# Patient Record
Sex: MALE | Race: WHITE | NOT HISPANIC OR LATINO | Employment: OTHER | URBAN - METROPOLITAN AREA
[De-identification: names, ages, dates, MRNs, and addresses within clinical notes are randomized per-mention and may not be internally consistent; named-entity substitution may affect disease eponyms.]

---

## 2017-01-30 ENCOUNTER — OFFICE VISIT (OUTPATIENT)
Dept: CARDIOLOGY | Facility: PHYSICIAN GROUP | Age: 82
End: 2017-01-30
Payer: MEDICARE

## 2017-01-30 ENCOUNTER — NON-PROVIDER VISIT (OUTPATIENT)
Dept: CARDIOLOGY | Facility: PHYSICIAN GROUP | Age: 82
End: 2017-01-30
Payer: MEDICARE

## 2017-01-30 VITALS
WEIGHT: 193 LBS | BODY MASS INDEX: 26.14 KG/M2 | HEART RATE: 78 BPM | DIASTOLIC BLOOD PRESSURE: 80 MMHG | BODY MASS INDEX: 26.14 KG/M2 | DIASTOLIC BLOOD PRESSURE: 80 MMHG | WEIGHT: 193 LBS | SYSTOLIC BLOOD PRESSURE: 110 MMHG | HEIGHT: 72 IN | OXYGEN SATURATION: 93 % | HEART RATE: 78 BPM | HEIGHT: 72 IN | SYSTOLIC BLOOD PRESSURE: 110 MMHG | OXYGEN SATURATION: 93 %

## 2017-01-30 DIAGNOSIS — E78.2 MIXED HYPERLIPIDEMIA: ICD-10-CM

## 2017-01-30 DIAGNOSIS — Z95.0 PRESENCE OF PERMANENT CARDIAC PACEMAKER: ICD-10-CM

## 2017-01-30 DIAGNOSIS — I10 ESSENTIAL HYPERTENSION: ICD-10-CM

## 2017-01-30 DIAGNOSIS — I49.5 SICK SINUS SYNDROME (HCC): ICD-10-CM

## 2017-01-30 DIAGNOSIS — I44.30 AV BLOCK: ICD-10-CM

## 2017-01-30 PROCEDURE — G8432 DEP SCR NOT DOC, RNG: HCPCS | Performed by: INTERNAL MEDICINE

## 2017-01-30 PROCEDURE — G8484 FLU IMMUNIZE NO ADMIN: HCPCS | Performed by: INTERNAL MEDICINE

## 2017-01-30 PROCEDURE — G8420 CALC BMI NORM PARAMETERS: HCPCS | Performed by: INTERNAL MEDICINE

## 2017-01-30 PROCEDURE — 99214 OFFICE O/P EST MOD 30 MIN: CPT | Performed by: INTERNAL MEDICINE

## 2017-01-30 PROCEDURE — 93288 INTERROG EVL PM/LDLS PM IP: CPT | Performed by: NURSE PRACTITIONER

## 2017-01-30 PROCEDURE — 1101F PT FALLS ASSESS-DOCD LE1/YR: CPT | Mod: 8P | Performed by: INTERNAL MEDICINE

## 2017-01-30 PROCEDURE — 1036F TOBACCO NON-USER: CPT | Performed by: INTERNAL MEDICINE

## 2017-01-30 PROCEDURE — 4040F PNEUMOC VAC/ADMIN/RCVD: CPT | Performed by: INTERNAL MEDICINE

## 2017-01-30 RX ORDER — RIVASTIGMINE 9.5 MG/24H
1 PATCH, EXTENDED RELEASE TRANSDERMAL DAILY
COMMUNITY
End: 2018-02-21

## 2017-01-30 ASSESSMENT — ENCOUNTER SYMPTOMS
MEMORY LOSS: 1
SHORTNESS OF BREATH: 1

## 2017-01-30 NOTE — MR AVS SNAPSHOT
Moises Baezanetta   2017 2:40 PM   Office Visit   MRN: [id number]    [facility] Phone:  [phone]    Description:  Male : 1931   Provider:  Kenna Hunter M.D.           Reason for Visit     Follow-Up           Allergies as of 2017     Allergen Noted Reactions    Metformin 07/15/2016       seizures    Penicillins 2015   Unspecified    Pt unsure of reaction.      You were diagnosed with     Essential hypertension   [2552369]       Mixed hyperlipidemia   [272.2.ICD-9-CM]       Presence of permanent cardiac pacemaker   [633313]         Vital Signs     Blood Pressure Pulse Height Weight Body Mass Index Oxygen Saturation    110/80 mmHg 78 1.829 m (6') 87.544 kg (193 lb) 26.17 kg/m2 93%    Smoking Status                   Never Smoker            Basic Information     Date Of Birth Sex Race Ethnicity Preferred Language    1931 Male White Non- English      Your appointments     2017  2:40 PM   PREVIOUS PATIENT with Kenna Hunter M.D.   [facility] ([facility]    [address] [facility] [facility] [address]   [phone]            Jul 10, 2017 10:40 AM   PACER CHECK ONLY with MARKO Kimball   [facility] ([facility]    [address] [facility] [facility] [address]   [phone]            Jul 10, 2017 11:00 AM   FOLLOW UP with Kenna Hunter M.D.   [facility] ([facility]    [address] [facility] [facility] [address]   [phone]              Problem List              ICD-10-CM Priority Class Noted - Resolved    Atrial fibrillation (CMS-HCC) I48.91 High  2012 - Present    Fatigue R53.83 Low  2012 - Present    Gout M10.9 Low  2012 - Present    Hyperlipidemia E78.5 High  2012 - Present    Hypertension I10 High  2012 - Present    Hypokalemia E87.6 Medium  2012 - Present    Presence of permanent cardiac pacemaker Z95.0 High  1/16/2012 - Present    Prostate cancer (CMS-HCC) C61 Low  10/7/2013 - Present    Sick sinus syndrome (CMS-HCC) I49.5 High  10/7/2013 - Present    AV block I44.30 High  10/7/2013 - Present    Seizure (CMS-HCC) R56.9   7/6/2015 - Present    Dementia without behavioral disturbance F03.90   7/25/2016 - Present    Type 2 diabetes mellitus (CMS-HCC) E11.9   7/25/2016 - Present    Meningioma (CMS-HCC) D32.9   7/7/2015 - Present      Health Maintenance        Date Due Completion Dates    DIABETES MONOFILAMENT / LE EXAM 3/16/1932 ---    RETINAL SCREENING 9/16/1949 ---    URINE ACR / MICROALBUMIN 9/16/1949 ---    IMM DTaP/Tdap/Td Vaccine (1 - Tdap) 9/16/1950 ---    COLONOSCOPY 9/16/1981 ---    IMM ZOSTER VACCINE 9/16/1991 ---    A1C SCREENING 1/7/2016 7/7/2015    IMM PNEUMOCOCCAL 65+ (ADULT) LOW/MEDIUM RISK SERIES (2 of 2 - PCV13) 7/7/2016 7/7/2015    IMM INFLUENZA (1) 9/1/2016 ---    FASTING LIPID PROFILE 12/17/2016 12/17/2015, 7/7/2015, 10/15/2014, 1/26/2012    SERUM CREATININE 12/13/2017 12/13/2016, 7/13/2015, 7/12/2015, 7/10/2015, 7/9/2015, 7/8/2015, 7/7/2015, 7/6/2015, 10/15/2014, 8/20/2013, 1/26/2012            Current Immunizations     Pneumococcal polysaccharide vaccine (PPSV-23) 7/7/2015  6:23 AM      Below and/or attached are the medications your provider expects you to take. Review all of your home medications and newly ordered medications with your provider and/or pharmacist. Follow medication instructions as directed by your provider and/or pharmacist. Please keep your medication list with you and share with your provider. Update the information when medications are discontinued, doses are changed, or new medications (including over-the-counter products) are added; and carry medication information at all times in the event of emergency situations     Allergies:  METFORMIN - (reactions not documented)     PENICILLINS - Unspecified               Medications  Valid  as of: January 30, 2017 -  2:33 PM    Generic Name Brand Name Tablet Size Instructions for use    Allopurinol (Tab) ZYLOPRIM 100 MG Take 1 Tab by mouth every day.        Aspirin (Tablet Delayed Response) aspirin 81 MG Take 81 mg by mouth.        Atenolol (Tab) TENORMIN 50 MG Take 1 Tab by mouth every day.        Rivastigmine (PATCH 24 HR) EXELON 9.5 MG/24HR Apply 1 Patch to skin as directed every day.        Rosuvastatin Calcium (Tab) CRESTOR 5 MG TAKE 1 TABLET BY MOUTH EVERY DAY        TraZODone HCl (Tab) DESYREL 50 MG Take 50 mg by mouth.        .                 Medicines prescribed today were sent to:     [facility] [address] [place] [address] [place]    [address] [place] [address]    Phone: [phone] Fax: [phone]    Open 24 Hours?: No      Medication refill instructions:       If your prescription bottle indicates you have medication refills left, it is not necessary to call your provider’s office. Please contact your pharmacy and they will refill your medication.    If your prescription bottle indicates you do not have any refills left, you may request refills at any time through one of the following ways: The online [employer] system (except Urgent Care), by calling your provider’s office, or by asking your pharmacy to contact your provider’s office with a refill request. Medication refills are processed only during regular business hours and may not be available until the next business day. Your provider may request additional information or to have a follow-up visit with you prior to refilling your medication.   *Please Note: Medication refills are assigned a new Rx number when refilled electronically. Your pharmacy may indicate that no refills were authorized even though a new prescription for the same medication is available at the pharmacy. Please request the medicine by name with the pharmacy before contacting your provider for a  refill.        Your To Do List     Future Labs/Procedures Complete By Expires    COMP METABOLIC PANEL  As directed 1/30/2018    LIPID PROFILE  As directed 1/30/2018    LIPID PROFILE  As directed 1/30/2018         MyChart Status: Patient Declined

## 2017-01-30 NOTE — MR AVS SNAPSHOT
"        Moises Baezazehra   2017 2:00 PM   Non-Provider Visit   MRN: [id number]    [facility] Phone:  [phone]    Description:  Male : 1931   Provider:  VITA KimballPCELINE.           Reason for Visit     AICD Check/Dysfunction [facility]      Allergies as of 2017     Allergen Noted Reactions    Metformin 07/15/2016       seizures    Penicillins 2015   Unspecified    Pt unsure of reaction.      You were diagnosed with     Presence of permanent cardiac pacemaker   [397964]       Sick sinus syndrome (CMS-HCC)   [261621]       AV block   [782921]         Vital Signs     Blood Pressure Pulse Height Weight Body Mass Index Oxygen Saturation    110/80 mmHg 78 1.829 m (6' 0.01\") 87.544 kg (193 lb) 26.17 kg/m2 93%    Smoking Status                   Never Smoker            Basic Information     Date Of Birth Sex Race Ethnicity Preferred Language    1931 Male White Non- English      Your appointments     2017  2:40 PM   PREVIOUS PATIENT with Kenna Hunter M.D.   [facility] ([facility]    [address] [facility] [address]   [phone]            Jul 10, 2017 10:40 AM   PACER CHECK ONLY with VITA KimballP.ZEHRA.   [facility] ([facility]    [address] [facility] [address]   [phone]            Jul 10, 2017 11:00 AM   FOLLOW UP with Kenna Hunter M.D.   [facility]    [address] [facility] [address]   [phone]              Problem List              ICD-10-CM Priority Class Noted - Resolved    Atrial fibrillation (CMS-HCC) I48.91 High  2012 - Present    Fatigue R53.83 Low  2012 - Present    Gout M10.9 Low  2012 - Present    Hyperlipidemia E78.5 High  2012 - Present    Hypertension I10 High  2012 - Present    Hypokalemia E87.6 Medium  2012 " - Present    Presence of permanent cardiac pacemaker Z95.0 High  1/16/2012 - Present    Prostate cancer (CMS-HCC) C61 Low  10/7/2013 - Present    Sick sinus syndrome (CMS-HCC) I49.5 High  10/7/2013 - Present    AV block I44.30 High  10/7/2013 - Present    Seizure (CMS-HCC) R56.9   7/6/2015 - Present    Dementia without behavioral disturbance F03.90   7/25/2016 - Present    Type 2 diabetes mellitus (CMS-HCC) E11.9   7/25/2016 - Present    Meningioma (CMS-HCC) D32.9   7/7/2015 - Present      Health Maintenance        Date Due Completion Dates    DIABETES MONOFILAMENT / LE EXAM 3/16/1932 ---    RETINAL SCREENING 9/16/1949 ---    URINE ACR / MICROALBUMIN 9/16/1949 ---    IMM DTaP/Tdap/Td Vaccine (1 - Tdap) 9/16/1950 ---    COLONOSCOPY 9/16/1981 ---    IMM ZOSTER VACCINE 9/16/1991 ---    A1C SCREENING 1/7/2016 7/7/2015    IMM PNEUMOCOCCAL 65+ (ADULT) LOW/MEDIUM RISK SERIES (2 of 2 - PCV13) 7/7/2016 7/7/2015    IMM INFLUENZA (1) 9/1/2016 ---    FASTING LIPID PROFILE 12/17/2016 12/17/2015, 7/7/2015, 10/15/2014, 1/26/2012    SERUM CREATININE 12/13/2017 12/13/2016, 7/13/2015, 7/12/2015, 7/10/2015, 7/9/2015, 7/8/2015, 7/7/2015, 7/6/2015, 10/15/2014, 8/20/2013, 1/26/2012            Current Immunizations     Pneumococcal polysaccharide vaccine (PPSV-23) 7/7/2015  6:23 AM      Below and/or attached are the medications your provider expects you to take. Review all of your home medications and newly ordered medications with your provider and/or pharmacist. Follow medication instructions as directed by your provider and/or pharmacist. Please keep your medication list with you and share with your provider. Update the information when medications are discontinued, doses are changed, or new medications (including over-the-counter products) are added; and carry medication information at all times in the event of emergency situations     Allergies:  METFORMIN - (reactions not documented)     PENICILLINS - Unspecified                  Medications  Valid as of: January 30, 2017 -  2:33 PM    Generic Name Brand Name Tablet Size Instructions for use    Allopurinol (Tab) ZYLOPRIM 100 MG Take 1 Tab by mouth every day.        Aspirin (Tablet Delayed Response) aspirin 81 MG Take 81 mg by mouth.        Atenolol (Tab) TENORMIN 50 MG Take 1 Tab by mouth every day.        Rivastigmine (PATCH 24 HR) EXELON 9.5 MG/24HR Apply 1 Patch to skin as directed every day.        Rosuvastatin Calcium (Tab) CRESTOR 5 MG TAKE 1 TABLET BY MOUTH EVERY DAY        TraZODone HCl (Tab) DESYREL 50 MG Take 50 mg by mouth.        .                 Medicines prescribed today were sent to:     [facility] [address] [place] [address] [place]    [address] [place] [address] [place] [address]    Phone: [phone] Fax: [phone]    Open 24 Hours?: No      Medication refill instructions:       If your prescription bottle indicates you have medication refills left, it is not necessary to call your provider’s office. Please contact your pharmacy and they will refill your medication.    If your prescription bottle indicates you do not have any refills left, you may request refills at any time through one of the following ways: The online [employer] system (except Urgent Care), by calling your provider’s office, or by asking your pharmacy to contact your provider’s office with a refill request. Medication refills are processed only during regular business hours and may not be available until the next business day. Your provider may request additional information or to have a follow-up visit with you prior to refilling your medication.   *Please Note: Medication refills are assigned a new Rx number when refilled electronically. Your pharmacy may indicate that no refills were authorized even though a new prescription for the same medication is available at the pharmacy. Please request the medicine by name with the pharmacy before contacting your  provider for a refill.           MyChart Status: Patient Declined

## 2017-01-30 NOTE — PROGRESS NOTES
Subjective:   Moises Ordoñez is a 85 y.o. male with known history of sick sinus syndrome status post pacemaker, paroxysmal atrial fibrillation not on anticoagulation, hypertension, dyslipidemia, advanced dementia presenting today for follow-up evaluation of device.    Patient has advanced dementia and unable to get much information. According to wife he never complains of any chest pain palpitations. No complaints of dyspnea, lower extremity edema.      Past Medical History   Diagnosis Date   • Atrial fibrillation (CMS-[facility])     • Fatigue     • GOUT     • Hyperlipidemia     • Hypertension     • Hypokalemia     • Sick sinus syndrome (CMS-[facility])       1991, 1997, 2003.   • Prostate cancer (CMS-[facility]) 1992     Status post radical prostatectomy.   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 DR #2110 implanted by Dr. Gamal Borja. Original device implanted in 1992.   • Inguinal hernia repair  1993         • Prostatectomy, radical retro  1992         • Tonsillectomy  1937         • Recovery  8/21/2013     Performed by Cath-Recovery Surgery at SURGERY SAME DAY [facility] ORS     Family History   Problem Relation Age of Onset   • Lung Disease Father      COPD   • Other Brother      Rheumatic fever     History   Smoking status   • Never Smoker    Smokeless tobacco   • Never Used     Allergies   Allergen Reactions   • Metformin      seizures   • Penicillins Unspecified     Pt unsure of reaction.     Outpatient Encounter Prescriptions as of 1/30/2017   Medication Sig Dispense Refill   • CRESTOR 5 MG Tab TAKE 1 TABLET BY MOUTH EVERY DAY 30 Tab 6   • allopurinol (ZYLOPRIM) 100 MG Tab Take 1 Tab by mouth every day. 90 Tab 3   • atenolol (TENORMIN) 50 MG Tab Take 1 Tab by mouth every day. 90 Tab 1   • aspirin 81 MG EC tablet Take 81 mg by mouth.      • trazodone (DESYREL) 50 MG Tab Take 50 mg by mouth.       No facility-administered encounter medications on file as of 2017.     Review of Systems   Unable to perform ROS: dementia   Respiratory: Positive for shortness of breath.    Cardiovascular: Positive for chest pain.   Psychiatric/Behavioral: Positive for memory loss.        Objective:   There were no vitals taken for this visit.    Physical Exam   Constitutional: He appears well-developed and well-nourished.   Mild kyphoscoliosis.   HENT:   Head: Normocephalic and atraumatic.   Eyes: Conjunctivae and EOM are normal. Right eye exhibits no discharge. Left eye exhibits no discharge.   Neck: No JVD present. No tracheal deviation present. No thyroid mass present.   Cardiovascular: Normal rate, regular rhythm, S1 normal, S2 normal and normal pulses.  PMI is not displaced.  Exam reveals no gallop.    No murmur heard.  Pulses:       Carotid pulses are 2+ on the right side, and 2+ on the left side.       Radial pulses are 2+ on the right side, and 2+ on the left side.        Femoral pulses are 2+ on the right side, and 2+ on the left side.       Dorsalis pedis pulses are 2+ on the right side, and 2+ on the left side.   No peripheral edema.   Pulmonary/Chest: Effort normal and breath sounds normal. No respiratory distress. He has no wheezes. He has no rales.   Abdominal: Soft. Normal appearance. He exhibits no abdominal bruit. There is no hepatosplenomegaly. There is no tenderness.   Musculoskeletal: Normal range of motion. He exhibits no edema.        Thoracic back: He exhibits no tenderness and no spasm.   Neurological: He is alert.   He has severe short-term memory deficit.   Skin: No rash noted. No cyanosis. Nails show no clubbing.   Psychiatric: He has a normal mood and affect.     EK/6/15  EKG personally reviewed by me.  AV paced at 70 bpm.    Results for ANNI ALCAZAR (MRN [id number]) as of 2017 14:07   2016    Sodium 137  140   Potassium 3.7 4.2   Chloride 104 105   Co2 26 31   Anion Gap 7.0 4.0   Glucose 109 (H) 111 (H)   Bun 15 17   Creatinine 0.88 1.00   GFR If Non African American >60 >60   Calcium 9.5 9.4   AST(SGOT)  22   ALT(SGPT)  19   Alkaline Phosphatase  74     Assessment:     1. SSS s/p PPM  2. Paroxysmal atrial fibrillation  3. Dyslipidemia  4. HTN    Medical Decision Making:  Today's Assessment / Status / Plan:     1. Device interrogation from today showed no mode switching episodes.  Normal sensing of RA lead; unable to measure R waves. Stable capture of RA and RV leads; stable impedances. Battery longevity is 6.7-7.3 years.  2. Last 6 months no episodes of A. Fib  Secondary to advanced dementia will avoid anticoagulation for now.  If patient has further episodes of A. fib will discuss with family before initiating anticoagulation.  Continue aspirin.  3. Continue Crestor.  Check labs now and again in one year  4. Blood pressure well controlled on atenolol.    Follow-up in 6 months with Agueda and in one year with me.  Check labs now and in one year    Thank you for allowing me to participate in taking care of patient.    Kenna Tsai MD.

## 2017-01-30 NOTE — Clinical Note
[facility]   [address] [place] [address]  Phone: [phone]  Fax: [phone]              Moises Ordoñez  9/16/1931    Encounter Date: 1/30/2017    Kenna Hunter M.D.          PROGRESS NOTE:  Subjective:   Moises Ordoñez is a 85 y.o. male with known history of sick sinus syndrome status post pacemaker, paroxysmal atrial fibrillation not on anticoagulation, hypertension, dyslipidemia, advanced dementia presenting today for follow-up evaluation of device.    Patient has advanced dementia and unable to get much information. According to wife he never complains of any chest pain palpitations. No complaints of dyspnea, lower extremity edema.      Past Medical History   Diagnosis Date   • Atrial fibrillation (CMS-[facility])     • Fatigue     • GOUT     • Hyperlipidemia     • Hypertension     • Hypokalemia     • Sick sinus syndrome (CMS-HCC)       1991, 1997, 2003.   • Prostate cancer (CMS-[facility]) 1992     Status post radical prostatectomy.   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 DR #2110 implanted by Dr. Gamal Borja. Original device implanted in 1992.   • Inguinal hernia repair  1993         • Prostatectomy, radical retro  1992         • Tonsillectomy  1937         • Recovery  8/21/2013     Performed by Cath-Recovery Surgery at SURGERY SAME DAY [facility] ORS     Family History   Problem Relation Age of Onset   • Lung Disease Father      COPD   • Other Brother      Rheumatic fever     History   Smoking status   • Never Smoker    Smokeless tobacco   • Never Used     Allergies   Allergen Reactions   • Metformin      seizures   • Penicillins Unspecified     Pt unsure of reaction.     Outpatient Encounter Prescriptions as of 1/30/2017   Medication Sig  Dispense Refill   • CRESTOR 5 MG Tab TAKE 1 TABLET BY MOUTH EVERY DAY 30 Tab 6   • allopurinol (ZYLOPRIM) 100 MG Tab Take 1 Tab by mouth every day. 90 Tab 3   • atenolol (TENORMIN) 50 MG Tab Take 1 Tab by mouth every day. 90 Tab 1   • aspirin 81 MG EC tablet Take 81 mg by mouth.     • trazodone (DESYREL) 50 MG Tab Take 50 mg by mouth.       No facility-administered encounter medications on file as of 1/30/2017.     Review of Systems   Unable to perform ROS: dementia   Respiratory: Positive for shortness of breath.    Cardiovascular: Positive for chest pain.   Psychiatric/Behavioral: Positive for memory loss.        Objective:   There were no vitals taken for this visit.    Physical Exam   Constitutional: He appears well-developed and well-nourished.   Mild kyphoscoliosis.   HENT:   Head: Normocephalic and atraumatic.   Eyes: Conjunctivae and EOM are normal. Right eye exhibits no discharge. Left eye exhibits no discharge.   Neck: No JVD present. No tracheal deviation present. No thyroid mass present.   Cardiovascular: Normal rate, regular rhythm, S1 normal, S2 normal and normal pulses.  PMI is not displaced.  Exam reveals no gallop.    No murmur heard.  Pulses:       Carotid pulses are 2+ on the right side, and 2+ on the left side.       Radial pulses are 2+ on the right side, and 2+ on the left side.        Femoral pulses are 2+ on the right side, and 2+ on the left side.       Dorsalis pedis pulses are 2+ on the right side, and 2+ on the left side.   No peripheral edema.   Pulmonary/Chest: Effort normal and breath sounds normal. No respiratory distress. He has no wheezes. He has no rales.   Abdominal: Soft. Normal appearance. He exhibits no abdominal bruit. There is no hepatosplenomegaly. There is no tenderness.   Musculoskeletal: Normal range of motion. He exhibits no edema.        Thoracic back: He exhibits no tenderness and no spasm.   Neurological: He is alert.   He has severe short-term memory deficit.   Skin:  No rash noted. No cyanosis. Nails show no clubbing.   Psychiatric: He has a normal mood and affect.     EK/6/15  EKG personally reviewed by me.  AV paced at 70 bpm.    Results for ANNI ALCAZAR (MRN [id number]) as of 2017 14:07   2016    Sodium 137 140   Potassium 3.7 4.2   Chloride 104 105   Co2 26 31   Anion Gap 7.0 4.0   Glucose 109 (H) 111 (H)   Bun 15 17   Creatinine 0.88 1.00   GFR If Non African American >60 >60   Calcium 9.5 9.4   AST(SGOT)  22   ALT(SGPT)  19   Alkaline Phosphatase  74     Assessment:     1. SSS s/p PPM  2. Paroxysmal atrial fibrillation  3. Dyslipidemia  4. HTN    Medical Decision Making:  Today's Assessment / Status / Plan:     1. Device interrogation from today showed no mode switching episodes.  Normal sensing of RA lead; unable to measure R waves. Stable capture of RA and RV leads; stable impedances. Battery longevity is 6.7-7.3 years.  2. Last 6 months no episodes of A. Fib  Secondary to advanced dementia will avoid anticoagulation for now.  If patient has further episodes of A. fib will discuss with family before initiating anticoagulation.  3. Continue Crestor.  Check labs now and again in one year  4. Blood pressure well controlled on atenolol.    Follow-up in 6 months with Agueda and in one year with me.  Check labs now and in one year    Thank you for allowing me to participate in taking care of patient.    Kenna Hunter. MD.      Shellie Borja M.D.  [address]  [address]  [facility] [address]  VIA In Basket

## 2017-01-30 NOTE — PROGRESS NOTES
Device is working normally. No mode switching episodes.  Normal sensing of RA lead; unable to measure R waves. Stable capture of RA and RV leads; stable impedances. Battery longevity is 6.7-7.3 years.  No changes are made today.    FU in 6 months for next PM check with me. He does see Dr. Hunter today as well.    Collaborating MD: Elsa

## 2017-02-22 ENCOUNTER — HOSPITAL ENCOUNTER (OUTPATIENT)
Dept: LAB | Facility: MEDICAL CENTER | Age: 82
End: 2017-02-22
Attending: INTERNAL MEDICINE
Payer: MEDICARE

## 2017-02-22 DIAGNOSIS — E78.2 MIXED HYPERLIPIDEMIA: ICD-10-CM

## 2017-02-22 LAB
ALBUMIN SERPL BCP-MCNC: 4.1 G/DL (ref 3.2–4.9)
ALBUMIN/GLOB SERPL: 1.2 G/DL
ALP SERPL-CCNC: 74 U/L (ref 30–99)
ALT SERPL-CCNC: 20 U/L (ref 2–50)
ANION GAP SERPL CALC-SCNC: 7 MMOL/L (ref 0–11.9)
AST SERPL-CCNC: 25 U/L (ref 12–45)
BILIRUB SERPL-MCNC: 0.5 MG/DL (ref 0.1–1.5)
BUN SERPL-MCNC: 14 MG/DL (ref 8–22)
CALCIUM SERPL-MCNC: 9.4 MG/DL (ref 8.5–10.5)
CHLORIDE SERPL-SCNC: 104 MMOL/L (ref 96–112)
CHOLEST SERPL-MCNC: 100 MG/DL (ref 100–199)
CO2 SERPL-SCNC: 30 MMOL/L (ref 20–33)
CREAT SERPL-MCNC: 0.8 MG/DL (ref 0.5–1.4)
GLOBULIN SER CALC-MCNC: 3.3 G/DL (ref 1.9–3.5)
GLUCOSE SERPL-MCNC: 93 MG/DL (ref 65–99)
HDLC SERPL-MCNC: 36 MG/DL
LDLC SERPL CALC-MCNC: 46 MG/DL
POTASSIUM SERPL-SCNC: 4.1 MMOL/L (ref 3.6–5.5)
PROT SERPL-MCNC: 7.4 G/DL (ref 6–8.2)
SODIUM SERPL-SCNC: 141 MMOL/L (ref 135–145)
TRIGL SERPL-MCNC: 90 MG/DL (ref 0–149)

## 2017-02-22 PROCEDURE — 80061 LIPID PANEL: CPT

## 2017-02-22 PROCEDURE — 36415 COLL VENOUS BLD VENIPUNCTURE: CPT

## 2017-02-22 PROCEDURE — 80053 COMPREHEN METABOLIC PANEL: CPT

## 2017-02-22 RX ORDER — ATENOLOL 50 MG/1
TABLET ORAL
Qty: 90 TAB | Refills: 1 | Status: SHIPPED | OUTPATIENT
Start: 2017-02-22 | End: 2017-08-17 | Stop reason: SDUPTHER

## 2017-02-23 NOTE — PROGRESS NOTES
Results for ANNI ALCAZAR (MRN [id number]) as of 2/23/2017 15:55   12/13/2016 2/22/2017    Sodium 140 141   Potassium 4.2 4.1   Chloride 105 104   Co2 31 30   Anion Gap 4.0 7.0   Glucose 111 (H) 93   Bun 17 14   Creatinine 1.00 0.80   GFR If Non African American >60 >60   Calcium 9.4 9.4   AST(SGOT) 22 25   ALT(SGPT) 19 20   Alkaline Phosphatase 74 74   Cholesterol,Tot  100   Triglycerides  90   HDL  36 (A)   LDL  46

## 2017-05-16 RX ORDER — ROSUVASTATIN CALCIUM 5 MG/1
TABLET, COATED ORAL
Qty: 30 TAB | Refills: 3 | Status: SHIPPED | OUTPATIENT
Start: 2017-05-16 | End: 2017-08-17 | Stop reason: SDUPTHER

## 2017-07-17 LAB — HBA1C MFR BLD: 6.5 % (ref ?–5.8)

## 2017-07-18 ENCOUNTER — OFFICE VISIT (OUTPATIENT)
Dept: MEDICAL GROUP | Facility: MEDICAL CENTER | Age: 82
End: 2017-07-18
Payer: MEDICARE

## 2017-07-18 VITALS
TEMPERATURE: 98.2 F | OXYGEN SATURATION: 93 % | HEART RATE: 70 BPM | SYSTOLIC BLOOD PRESSURE: 124 MMHG | WEIGHT: 194 LBS | DIASTOLIC BLOOD PRESSURE: 72 MMHG | HEIGHT: 72 IN | BODY MASS INDEX: 26.28 KG/M2

## 2017-07-18 DIAGNOSIS — F03.90 DEMENTIA WITHOUT BEHAVIORAL DISTURBANCE, UNSPECIFIED DEMENTIA TYPE: ICD-10-CM

## 2017-07-18 DIAGNOSIS — E11.9 TYPE 2 DIABETES MELLITUS WITHOUT COMPLICATION, WITHOUT LONG-TERM CURRENT USE OF INSULIN (HCC): ICD-10-CM

## 2017-07-18 DIAGNOSIS — D32.9 MENINGIOMA (HCC): ICD-10-CM

## 2017-07-18 DIAGNOSIS — R29.6 FREQUENT FALLS: ICD-10-CM

## 2017-07-18 PROCEDURE — 99214 OFFICE O/P EST MOD 30 MIN: CPT | Performed by: FAMILY MEDICINE

## 2017-07-18 RX ORDER — GLIPIZIDE 5 MG/1
5 TABLET ORAL 2 TIMES DAILY
Qty: 60 TAB | Refills: 3 | Status: SHIPPED | OUTPATIENT
Start: 2017-07-18 | End: 2017-08-17 | Stop reason: SDUPTHER

## 2017-07-18 NOTE — MR AVS SNAPSHOT
Moises Baezanetta   2017 10:40 AM   Office Visit   MRN: [id number]    Department:  [facility]   Dept Phone:  [phone]    Description:  Male : 1931   Provider:  Shellie Borja M.D.           Reason for Visit     Hospital Follow-up           Allergies as of 2017     Allergen Noted Reactions    Metformin 07/15/2016       seizures    Penicillins 2015   Unspecified    Pt unsure of reaction.      You were diagnosed with     Type 2 diabetes mellitus without complication, without long-term current use of insulin (CMS-HCC)   [4993473]       Dementia without behavioral disturbance, unspecified dementia type   [5833917]       Meningioma (CMS-HCC)   [687934]       Frequent falls   [190917]         Vital Signs     Blood Pressure Pulse Temperature Height Weight Body Mass Index    124/72 mmHg 70 36.8 °C (98.2 °F) 1.829 m (6') 87.998 kg (194 lb) 26.31 kg/m2    Oxygen Saturation Smoking Status                93% Never Smoker           Basic Information     Date Of Birth Sex Race Ethnicity Preferred Language    1931 Male White Non- English      Your appointments     Aug 16, 2017  3:00 PM   PACER CHECK ONLY with MARKO Kimball   [facility] (--)    [address] [address] [phone]            Aug 16, 2017  3:40 PM   FOLLOW UP with Kenna Hunter M.D.   [facility] (--)    [address] [address] [phone]            Aug 17, 2017 11:00 AM   Established Patient with Shellie Borja M.D.   [facility] ([facility])    [address]  [facility] [address] [phone]           You will be receiving a confirmation call a few days before your appointment from our automated call confirmation system.              Problem List              ICD-10-CM Priority Class Noted - Resolved    Atrial  fibrillation (CMS-HCC) I48.91 High  1/16/2012 - Present    Fatigue R53.83 Low  1/16/2012 - Present    Gout M10.9 Low  1/16/2012 - Present    Hyperlipidemia E78.5 High  1/16/2012 - Present    Hypertension I10 High  1/16/2012 - Present    Hypokalemia E87.6 Medium  1/16/2012 - Present    Presence of permanent cardiac pacemaker Z95.0 High  1/16/2012 - Present    Prostate cancer (CMS-HCC) C61 Low  10/7/2013 - Present    Sick sinus syndrome (CMS-HCC) I49.5 High  10/7/2013 - Present    AV block I44.30 High  10/7/2013 - Present    Seizure (CMS-HCC) R56.9   7/6/2015 - Present    Dementia without behavioral disturbance F03.90   7/25/2016 - Present    Type 2 diabetes mellitus (CMS-HCC) E11.9   7/25/2016 - Present    Meningioma (CMS-HCC) D32.9   7/7/2015 - Present    Frequent falls R29.6   7/18/2017 - Present      Health Maintenance        Date Due Completion Dates    DIABETES MONOFILAMENT / LE EXAM 3/16/1932 ---    RETINAL SCREENING 9/16/1949 ---    URINE ACR / MICROALBUMIN 9/16/1949 ---    IMM DTaP/Tdap/Td Vaccine (1 - Tdap) 9/16/1950 ---    COLONOSCOPY 9/16/1981 ---    IMM ZOSTER VACCINE 9/16/1991 ---    A1C SCREENING 1/7/2016 7/7/2015    IMM INFLUENZA (1) 9/1/2017 9/20/2016    FASTING LIPID PROFILE 2/22/2018 2/22/2017, 12/17/2015, 7/7/2015, 10/15/2014, 1/26/2012    SERUM CREATININE 2/22/2018 2/22/2017, 12/13/2016, 7/13/2015, 7/12/2015, 7/10/2015, 7/9/2015, 7/8/2015, 7/7/2015, 7/6/2015, 10/15/2014, 8/20/2013, 1/26/2012            Current Immunizations     13-VALENT PCV PREVNAR 10/5/2016    Influenza Vaccine Quad Inj (Preserved) 9/20/2016    Pneumococcal polysaccharide vaccine (PPSV-23) 7/7/2015  6:23 AM      Below and/or attached are the medications your provider expects you to take. Review all of your home medications and newly ordered medications with your provider and/or pharmacist. Follow medication instructions as directed by your provider and/or pharmacist. Please keep your medication list with you and share with your  provider. Update the information when medications are discontinued, doses are changed, or new medications (including over-the-counter products) are added; and carry medication information at all times in the event of emergency situations     Allergies:  METFORMIN - (reactions not documented)     PENICILLINS - Unspecified               Medications  Valid as of: July 18, 2017 - 10:53 AM    Generic Name Brand Name Tablet Size Instructions for use    Allopurinol (Tab) ZYLOPRIM 100 MG Take 1 Tab by mouth every day.        Aspirin (Tablet Delayed Response) aspirin 81 MG Take 81 mg by mouth.        Atenolol (Tab) TENORMIN 50 MG TAKE 1 TABLET BY MOUTH EVERY DAY        GlipiZIDE (Tab) GLUCOTROL 5 MG Take 1 Tab by mouth 2 times a day.        Rivastigmine (PATCH 24 HR) EXELON 9.5 MG/24HR Apply 1 Patch to skin as directed every day.        Rosuvastatin Calcium (Tab) CRESTOR 5 MG TAKE 1 TABLET BY MOUTH EVERY DAY        TraZODone HCl (Tab) DESYREL 50 MG Take 50 mg by mouth.        .                 Medicines prescribed today were sent to:     [facility] - [address] AT [address] [address]    Phone: [phone] Fax: [phone]    Open 24 Hours?: No    [facility] - [address] [address]    Phone: [phone] Fax: [phone]    Open 24 Hours?: No      Medication refill instructions:       If your prescription bottle indicates you have medication refills left, it is not necessary to call your provider’s office. Please contact your pharmacy and they will refill your medication.    If your prescription bottle indicates you do not have any refills left, you may request refills at any time through one of the following ways: The online [email or web address] system (except Urgent Care), by calling your provider’s office, or by asking your pharmacy to contact your provider’s office with  a refill request. Medication refills are processed only during regular business hours and may not be available until the next business day. Your provider may request additional information or to have a follow-up visit with you prior to refilling your medication.   *Please Note: Medication refills are assigned a new Rx number when refilled electronically. Your pharmacy may indicate that no refills were authorized even though a new prescription for the same medication is available at the pharmacy. Please request the medicine by name with the pharmacy before contacting your provider for a refill.        Your To Do List     Future Labs/Procedures Complete By Expires    BASIC METABOLIC PANEL  As directed 7/19/2018    HEMOGLOBIN A1C  As directed 7/19/2018      Referral     A referral request has been sent to our patient care coordination department. Please allow 3-5 business days for us to process this request and contact you either by phone or mail. If you do not hear from us by the 5th business day, please call us at [phone].           MyChart Status: Patient Declined

## 2017-07-19 NOTE — ASSESSMENT & PLAN NOTE
Patient continues to have worsening of his dementia. He is beginning to wander and about a month ago the police had to be called to find him. He needs constant supervision. The family is trying to do this in the home. His son is here today with the patient and his wife and they're concerned about his increased weakness and frequent falls. He seems unstable at times. He does have a history of a meningioma but that has not caused problems in the past. He hasn't been doing any physical therapy. He is followed by a neurologist for his dementia. His son  is concerned because he doesn't seem to sleep well. He is taking trazodone from the neurologist. This has been working well according to the wife except for the last 3 nights. We discussed the increased risk of falls with any medications for insomnia and that I would not recommend adding anything new in.

## 2017-07-19 NOTE — PROGRESS NOTES
Subjective:     Chief Complaint   Patient presents with   • Hospital Follow-up       Moises Ordoñez is a 85 y.o. male here today for evaluation and management of:    Dementia without behavioral disturbance  Patient continues to have worsening of his dementia. He is beginning to wander and about a month ago the police had to be called to find him. He needs constant supervision. The family is trying to do this in the home. His son is here today with the patient and his wife and they're concerned about his increased weakness and frequent falls. He seems unstable at times. He does have a history of a meningioma but that has not caused problems in the past. He hasn't been doing any physical therapy. He is followed by a neurologist for his dementia. His son  is concerned because he doesn't seem to sleep well. He is taking trazodone from the neurologist. This has been working well according to the wife except for the last 3 nights. We discussed the increased risk of falls with any medications for insomnia and that I would not recommend adding anything new in.    Type 2 diabetes mellitus  Patient was seen in the emergency room in [place] for urinary frequency and was found to have a elevated blood sugar of 194. The follow-up hemoglobin A1c was 6.5. Patient years ago was started on metformin and had a seizure that at the time was felt possibly related to metformin. The patient's son and wife cannot remember if he was hypoglycemic at that time. He loves sweets and has been eating more of that. And as previously mentioned he has not been exercising as much as he had in the past.       Allergies   Allergen Reactions   • Metformin      seizures   • Penicillins Unspecified     Pt unsure of reaction.       Current medicines (including changes today)  Current Outpatient Prescriptions   Medication Sig Dispense Refill   • glipiZIDE (GLUCOTROL) 5 MG Tab Take 1 Tab by mouth 2 times a day. 60 Tab 3   • rosuvastatin  (CRESTOR) 5 MG Tab TAKE 1 TABLET BY MOUTH EVERY DAY 30 Tab 3   • atenolol (TENORMIN) 50 MG Tab TAKE 1 TABLET BY MOUTH EVERY DAY 90 Tab 1   • rivastigmine (EXELON) 9.5 MG/24HR patch Apply 1 Patch to skin as directed every day.     • allopurinol (ZYLOPRIM) 100 MG Tab Take 1 Tab by mouth every day. 90 Tab 3   • aspirin 81 MG EC tablet Take 81 mg by mouth.     • trazodone (DESYREL) 50 MG Tab Take 50 mg by mouth.       No current facility-administered medications for this visit.       He  has a past medical history of Atrial fibrillation (CMS-HCC) ( ); Fatigue ( ); GOUT ( ); Hyperlipidemia ( ); Hypertension ( ); Hypokalemia ( ); Sick sinus syndrome (CMS-HCC) ( ); Prostate cancer (CMS-HCC) (1992); AV block, complete (CMS-HCC); and Dementia without behavioral disturbance (7/25/2016).    Patient Active Problem List    Diagnosis Date Noted   • Sick sinus syndrome (CMS-HCC) 10/07/2013     Priority: High   • AV block 10/07/2013     Priority: High   • Atrial fibrillation (CMS-HCC) 01/16/2012     Priority: High   • Hyperlipidemia 01/16/2012     Priority: High   • Hypertension 01/16/2012     Priority: High   • Presence of permanent cardiac pacemaker 01/16/2012     Priority: High   • Hypokalemia 01/16/2012     Priority: Medium   • Prostate cancer (CMS-HCC) 10/07/2013     Priority: Low   • Fatigue 01/16/2012     Priority: Low   • Gout 01/16/2012     Priority: Low   • Frequent falls 07/18/2017   • Dementia without behavioral disturbance 07/25/2016   • Type 2 diabetes mellitus (CMS-HCC) 07/25/2016   • Meningioma (CMS-HCC) 07/07/2015   • Seizure (CMS-HCC) 07/06/2015       ROS   No fever or chills.  No nausea or vomiting.  No chest pain or palpitations.  No cough or SOB.  No pain with urination or hematuria.  No black or bloody stools.       Objective:     Blood pressure 124/72, pulse 70, temperature 36.8 °C (98.2 °F), height 1.829 m (6'), weight 87.998 kg (194 lb), SpO2 93 %. Body mass index is 26.31 kg/(m^2).   Physical Exam:  Well  developed, well nourished.  Alert and in no acute distress.  Eye contact is good, speech goal directed, affect calm  Eyes: conjunctiva non-injected, sclera non-icteric.  Neck Supple.  No adenopathy or masses in the neck or supraclavicular regions. No thyromegaly  Lungs: clear to auscultation bilaterally with good excursion. No wheezes or rhonchi  CV: regular rate and rhythm. No murmur              Assessment and Plan:   The following treatment plan was discussed    1. Type 2 diabetes mellitus without complication, without long-term current use of insulin (CMS-[facility])  We'll start the patient on glipizide 5 mg twice a day. Recheck labs in a month  - BASIC METABOLIC PANEL; Future  - HEMOGLOBIN A1C; Future  - glipiZIDE (GLUCOTROL) 5 MG Tab; Take 1 Tab by mouth 2 times a day.  Dispense: 60 Tab; Refill: 3    2. Dementia without behavioral disturbance, unspecified dementia type  Refer to physical therapy. I did discuss with the wife and son that the patient may be approaching the time where they're going to need assistance. They continue to discuss this as a family  - REFERRAL TO PHYSICAL THERAPY Reason for Therapy: Eval/Treat/Report    3. Meningioma (CMS-[facility])  Increased risk of falls. Refer to physical therapy  - REFERRAL TO PHYSICAL THERAPY Reason for Therapy: Eval/Treat/Report    4. Frequent falls  Increased risk of falls. Refer to physical therapy  - REFERRAL TO PHYSICAL THERAPY Reason for Therapy: Eval/Treat/Report    Any change or worsening of signs or symptoms, patient encouraged to follow-up or report to the emergency room for further evaluation. Patient understands and agrees.    Followup: Return in about 3 months (around 10/18/2017).

## 2017-07-19 NOTE — ASSESSMENT & PLAN NOTE
Patient was seen in the emergency room in [place] for urinary frequency and was found to have a elevated blood sugar of 194. The follow-up hemoglobin A1c was 6.5. Patient years ago was started on metformin and had a seizure that at the time was felt possibly related to metformin. The patient's son and wife cannot remember if he was hypoglycemic at that time. He loves sweets and has been eating more of that. And as previously mentioned he has not been exercising as much as he had in the past.

## 2017-08-09 ENCOUNTER — HOSPITAL ENCOUNTER (OUTPATIENT)
Dept: LAB | Facility: MEDICAL CENTER | Age: 82
End: 2017-08-09
Attending: SPECIALIST
Payer: MEDICARE

## 2017-08-09 ENCOUNTER — HOSPITAL ENCOUNTER (OUTPATIENT)
Dept: LAB | Facility: MEDICAL CENTER | Age: 82
End: 2017-08-09
Attending: FAMILY MEDICINE
Payer: MEDICARE

## 2017-08-09 DIAGNOSIS — E11.9 TYPE 2 DIABETES MELLITUS WITHOUT COMPLICATION, WITHOUT LONG-TERM CURRENT USE OF INSULIN (HCC): ICD-10-CM

## 2017-08-09 LAB
ALBUMIN SERPL BCP-MCNC: 4.2 G/DL (ref 3.2–4.9)
ALBUMIN/GLOB SERPL: 1.4 G/DL
ALP SERPL-CCNC: 80 U/L (ref 30–99)
ALT SERPL-CCNC: 24 U/L (ref 2–50)
ANION GAP SERPL CALC-SCNC: 7 MMOL/L (ref 0–11.9)
ANION GAP SERPL CALC-SCNC: 8 MMOL/L (ref 0–11.9)
AST SERPL-CCNC: 25 U/L (ref 12–45)
BASOPHILS # BLD AUTO: 1.1 % (ref 0–1.8)
BASOPHILS # BLD: 0.06 K/UL (ref 0–0.12)
BILIRUB SERPL-MCNC: 0.7 MG/DL (ref 0.1–1.5)
BUN SERPL-MCNC: 16 MG/DL (ref 8–22)
BUN SERPL-MCNC: 16 MG/DL (ref 8–22)
CALCIUM SERPL-MCNC: 9.6 MG/DL (ref 8.5–10.5)
CALCIUM SERPL-MCNC: 9.7 MG/DL (ref 8.5–10.5)
CHLORIDE SERPL-SCNC: 103 MMOL/L (ref 96–112)
CHLORIDE SERPL-SCNC: 104 MMOL/L (ref 96–112)
CO2 SERPL-SCNC: 29 MMOL/L (ref 20–33)
CO2 SERPL-SCNC: 30 MMOL/L (ref 20–33)
CREAT SERPL-MCNC: 0.96 MG/DL (ref 0.5–1.4)
CREAT SERPL-MCNC: 1.01 MG/DL (ref 0.5–1.4)
EOSINOPHIL # BLD AUTO: 0.07 K/UL (ref 0–0.51)
EOSINOPHIL NFR BLD: 1.3 % (ref 0–6.9)
ERYTHROCYTE [DISTWIDTH] IN BLOOD BY AUTOMATED COUNT: 46.5 FL (ref 35.9–50)
EST. AVERAGE GLUCOSE BLD GHB EST-MCNC: 134 MG/DL
GFR SERPL CREATININE-BSD FRML MDRD: >60 ML/MIN/1.73 M 2
GFR SERPL CREATININE-BSD FRML MDRD: >60 ML/MIN/1.73 M 2
GLOBULIN SER CALC-MCNC: 3 G/DL (ref 1.9–3.5)
GLUCOSE SERPL-MCNC: 83 MG/DL (ref 65–99)
GLUCOSE SERPL-MCNC: 83 MG/DL (ref 65–99)
HBA1C MFR BLD: 6.3 % (ref 0–5.6)
HCT VFR BLD AUTO: 52.8 % (ref 42–52)
HGB BLD-MCNC: 17.5 G/DL (ref 14–18)
IMM GRANULOCYTES # BLD AUTO: 0.01 K/UL (ref 0–0.11)
IMM GRANULOCYTES NFR BLD AUTO: 0.2 % (ref 0–0.9)
LYMPHOCYTES # BLD AUTO: 0.74 K/UL (ref 1–4.8)
LYMPHOCYTES NFR BLD: 13.9 % (ref 22–41)
MCH RBC QN AUTO: 33.1 PG (ref 27–33)
MCHC RBC AUTO-ENTMCNC: 33.1 G/DL (ref 33.7–35.3)
MCV RBC AUTO: 100 FL (ref 81.4–97.8)
MONOCYTES # BLD AUTO: 0.51 K/UL (ref 0–0.85)
MONOCYTES NFR BLD AUTO: 9.6 % (ref 0–13.4)
NEUTROPHILS # BLD AUTO: 3.95 K/UL (ref 1.82–7.42)
NEUTROPHILS NFR BLD: 73.9 % (ref 44–72)
NRBC # BLD AUTO: 0 K/UL
NRBC BLD AUTO-RTO: 0 /100 WBC
PLATELET # BLD AUTO: 180 K/UL (ref 164–446)
PMV BLD AUTO: 10.5 FL (ref 9–12.9)
POTASSIUM SERPL-SCNC: 3.7 MMOL/L (ref 3.6–5.5)
POTASSIUM SERPL-SCNC: 3.7 MMOL/L (ref 3.6–5.5)
PROT SERPL-MCNC: 7.2 G/DL (ref 6–8.2)
RBC # BLD AUTO: 5.28 M/UL (ref 4.7–6.1)
SODIUM SERPL-SCNC: 140 MMOL/L (ref 135–145)
SODIUM SERPL-SCNC: 141 MMOL/L (ref 135–145)
WBC # BLD AUTO: 5.3 K/UL (ref 4.8–10.8)

## 2017-08-09 PROCEDURE — 80053 COMPREHEN METABOLIC PANEL: CPT

## 2017-08-09 PROCEDURE — 36415 COLL VENOUS BLD VENIPUNCTURE: CPT

## 2017-08-09 PROCEDURE — 85025 COMPLETE CBC W/AUTO DIFF WBC: CPT

## 2017-08-10 ENCOUNTER — TELEPHONE (OUTPATIENT)
Dept: MEDICAL GROUP | Facility: MEDICAL CENTER | Age: 82
End: 2017-08-10

## 2017-08-10 NOTE — TELEPHONE ENCOUNTER
----- Message from hSellie Borja M.D. sent at 8/10/2017  2:17 PM PDT -----  Please notify patient of their normal lab results.  Shellie Borja M.D.

## 2017-08-16 ENCOUNTER — NON-PROVIDER VISIT (OUTPATIENT)
Dept: CARDIOLOGY | Facility: PHYSICIAN GROUP | Age: 82
End: 2017-08-16
Payer: MEDICARE

## 2017-08-16 ENCOUNTER — OFFICE VISIT (OUTPATIENT)
Dept: CARDIOLOGY | Facility: PHYSICIAN GROUP | Age: 82
End: 2017-08-16
Payer: MEDICARE

## 2017-08-16 VITALS
SYSTOLIC BLOOD PRESSURE: 100 MMHG | OXYGEN SATURATION: 92 % | WEIGHT: 189 LBS | DIASTOLIC BLOOD PRESSURE: 60 MMHG | BODY MASS INDEX: 25.6 KG/M2 | HEIGHT: 72 IN | HEART RATE: 85 BPM

## 2017-08-16 VITALS
HEART RATE: 85 BPM | DIASTOLIC BLOOD PRESSURE: 60 MMHG | BODY MASS INDEX: 25.6 KG/M2 | OXYGEN SATURATION: 92 % | SYSTOLIC BLOOD PRESSURE: 100 MMHG | HEIGHT: 72 IN | WEIGHT: 189 LBS

## 2017-08-16 DIAGNOSIS — E78.2 MIXED HYPERLIPIDEMIA: ICD-10-CM

## 2017-08-16 DIAGNOSIS — I44.30 AV BLOCK: ICD-10-CM

## 2017-08-16 DIAGNOSIS — Z95.0 PRESENCE OF PERMANENT CARDIAC PACEMAKER: ICD-10-CM

## 2017-08-16 DIAGNOSIS — I10 ESSENTIAL HYPERTENSION: ICD-10-CM

## 2017-08-16 DIAGNOSIS — I49.5 SICK SINUS SYNDROME (HCC): ICD-10-CM

## 2017-08-16 PROCEDURE — 99214 OFFICE O/P EST MOD 30 MIN: CPT | Performed by: INTERNAL MEDICINE

## 2017-08-16 PROCEDURE — 93288 INTERROG EVL PM/LDLS PM IP: CPT | Performed by: NURSE PRACTITIONER

## 2017-08-16 ASSESSMENT — ENCOUNTER SYMPTOMS: MEMORY LOSS: 1

## 2017-08-16 NOTE — MR AVS SNAPSHOT
"Moises Ordoñez   2017 3:40 PM   Office Visit   MRN: [id number]    Department:  [facility]   Dept Phone:  [phone]    Description:  Male : 1931   Provider:  Kenna Hunter M.D.           Reason for Visit     Follow-Up           Allergies as of 2017     Allergen Noted Reactions    Metformin 07/15/2016       seizures    Penicillins 2015   Unspecified    Pt unsure of reaction.      Vital Signs     Blood Pressure Pulse Height Weight Body Mass Index Oxygen Saturation    100/60 mmHg 85 1.829 m (6' 0.01\") 85.73 kg (189 lb) 25.63 kg/m2 92%    Smoking Status                   Never Smoker            Basic Information     Date Of Birth Sex Race Ethnicity Preferred Language    1931 Male White Non- English      Your appointments     Aug 16, 2017  3:40 PM   FOLLOW UP with Kenna Hunter M.D.   [facility] (--)    [address] [facility] [address] [phone]            Aug 17, 2017 11:00 AM   Established Patient with Shellie Borja M.D.   [facility]    [address]  [facility] [id number] [phone]           You will be receiving a confirmation call a few days before your appointment from our automated call confirmation system.            2018 11:00 AM   PACER CHECK ONLY with MARKO Kimball   [facility] (--)    [address] [facility] [address] [phone]              Problem List              ICD-10-CM Priority Class Noted - Resolved    Atrial fibrillation (CMS-HCC) I48.91 Medium  2012 - Present    Fatigue R53.83 Low  2012 - Present    Gout M10.9 Low  2012 - Present    Hyperlipidemia E78.5 High  2012 - Present    Hypertension I10 High  2012 - Present    Hypokalemia E87.6 Medium  2012 - Present    Presence of permanent cardiac pacemaker Z95.0 " High  1/16/2012 - Present    Prostate cancer (CMS-HCC) C61 Low  10/7/2013 - Present    Sick sinus syndrome (CMS-HCC) I49.5 High  10/7/2013 - Present    AV block I44.30 High  10/7/2013 - Present    Seizure (CMS-HCC) R56.9   7/6/2015 - Present    Dementia without behavioral disturbance F03.90   7/25/2016 - Present    Type 2 diabetes mellitus (CMS-HCC) E11.9 Medium  7/25/2016 - Present    Meningioma (CMS-HCC) D32.9   7/7/2015 - Present    Frequent falls R29.6   7/18/2017 - Present      Health Maintenance        Date Due Completion Dates    DIABETES MONOFILAMENT / LE EXAM 3/16/1932 ---    RETINAL SCREENING 9/16/1949 ---    URINE ACR / MICROALBUMIN 9/16/1949 ---    IMM DTaP/Tdap/Td Vaccine (1 - Tdap) 9/16/1950 ---    COLONOSCOPY 9/16/1981 ---    IMM ZOSTER VACCINE 9/16/1991 ---    IMM INFLUENZA (1) 9/1/2017 9/20/2016    A1C SCREENING 2/9/2018 8/9/2017, 7/17/2017, 7/17/2017, 7/7/2015    FASTING LIPID PROFILE 2/22/2018 2/22/2017, 12/17/2015, 7/7/2015, 10/15/2014, 1/26/2012    SERUM CREATININE 8/9/2018 8/9/2017, 8/9/2017, 2/22/2017, 12/13/2016, 7/13/2015, 7/12/2015, 7/10/2015, 7/9/2015, 7/8/2015, 7/7/2015, 7/6/2015, 10/15/2014, 8/20/2013, 1/26/2012            Current Immunizations     13-VALENT PCV PREVNAR 10/5/2016    Influenza Vaccine Quad Inj (Preserved) 9/20/2016    Pneumococcal polysaccharide vaccine (PPSV-23) 7/7/2015  6:23 AM      Below and/or attached are the medications your provider expects you to take. Review all of your home medications and newly ordered medications with your provider and/or pharmacist. Follow medication instructions as directed by your provider and/or pharmacist. Please keep your medication list with you and share with your provider. Update the information when medications are discontinued, doses are changed, or new medications (including over-the-counter products) are added; and carry medication information at all times in the event of emergency situations     Allergies:  METFORMIN -  (reactions not documented)     PENICILLINS - Unspecified               Medications  Valid as of: August 16, 2017 -  3:18 PM    Generic Name Brand Name Tablet Size Instructions for use    Allopurinol (Tab) ZYLOPRIM 100 MG Take 1 Tab by mouth every day.        Aspirin (Tablet Delayed Response) aspirin 81 MG Take 81 mg by mouth.        Atenolol (Tab) TENORMIN 50 MG TAKE 1 TABLET BY MOUTH EVERY DAY        GlipiZIDE (Tab) GLUCOTROL 5 MG Take 1 Tab by mouth 2 times a day.        Rivastigmine (PATCH 24 HR) EXELON 9.5 MG/24HR Apply 1 Patch to skin as directed every day.        Rosuvastatin Calcium (Tab) CRESTOR 5 MG TAKE 1 TABLET BY MOUTH EVERY DAY        TraZODone HCl (Tab) DESYREL 50 MG Take 50 mg by mouth.        .                 Medicines prescribed today were sent to:     [facility] - [address] AT [address] [address]    Phone: [phone] Fax: [phone]    Open 24 Hours?: No    [facility] - [address] [address]    Phone: [phone] Fax: [phone]    Open 24 Hours?: No      Medication refill instructions:       If your prescription bottle indicates you have medication refills left, it is not necessary to call your provider’s office. Please contact your pharmacy and they will refill your medication.    If your prescription bottle indicates you do not have any refills left, you may request refills at any time through one of the following ways: The online [employer] system (except Urgent Care), by calling your provider’s office, or by asking your pharmacy to contact your provider’s office with a refill request. Medication refills are processed only during regular business hours and may not be available until the next business day. Your provider may request additional information or to have a follow-up visit with you prior to refilling your medication.     *Please Note: Medication refills are assigned a new Rx number when refilled electronically. Your pharmacy may indicate that no refills were authorized even though a new prescription for the same medication is available at the pharmacy. Please request the medicine by name with the pharmacy before contacting your provider for a refill.           MyChart Status: Patient Declined

## 2017-08-16 NOTE — MR AVS SNAPSHOT
"        Moises Ordoñez   2017 3:00 PM   Non-Provider Visit   MRN: [id number]    Department:  [facility] Phone:  [phone]    Description:  Male : 1931   Provider:  JALEN Kimball.           Reason for Visit     Pacemaker Check/Dysfunction [employer]      Allergies as of 2017     Allergen Noted Reactions    Metformin 07/15/2016       seizures    Penicillins 2015   Unspecified    Pt unsure of reaction.      You were diagnosed with     Presence of permanent cardiac pacemaker   [445466]       Sick sinus syndrome (CMS-HCC)   [466136]       AV block   [855693]         Vital Signs     Blood Pressure Pulse Height Weight Body Mass Index Oxygen Saturation    100/60 mmHg 85 1.829 m (6' 0.01\") 85.73 kg (189 lb) 25.63 kg/m2 92%    Smoking Status                   Never Smoker            Basic Information     Date Of Birth Sex Race Ethnicity Preferred Language    1931 Male White Non- English      Your appointments     Aug 16, 2017  3:40 PM   FOLLOW UP with Kenna Hunter M.D.   [facility] (--)    [address] [facility] [address] [phone]            Aug 17, 2017 11:00 AM   Established Patient with Shellie Borja M.D.   [facility] [employer])    [address]  [facility] [address] [phone]           You will be receiving a confirmation call a few days before your appointment from our automated call confirmation system.            2018 11:00 AM   PACER CHECK ONLY with VITA KimballPADAM   [facility] (--)    [address] [facility] [address] [phone]              Problem List              ICD-10-CM Priority Class Noted - Resolved    Atrial fibrillation (CMS-HCC) I48.91 Medium  2012 - Present    Fatigue R53.83 Low  2012 - Present    Gout M10.9 Low  2012 - Present   " Hyperlipidemia E78.5 High  1/16/2012 - Present    Hypertension I10 High  1/16/2012 - Present    Hypokalemia E87.6 Medium  1/16/2012 - Present    Presence of permanent cardiac pacemaker Z95.0 High  1/16/2012 - Present    Prostate cancer (CMS-HCC) C61 Low  10/7/2013 - Present    Sick sinus syndrome (CMS-HCC) I49.5 High  10/7/2013 - Present    AV block I44.30 High  10/7/2013 - Present    Seizure (CMS-HCC) R56.9   7/6/2015 - Present    Dementia without behavioral disturbance F03.90   7/25/2016 - Present    Type 2 diabetes mellitus (CMS-HCC) E11.9 Medium  7/25/2016 - Present    Meningioma (CMS-HCC) D32.9   7/7/2015 - Present    Frequent falls R29.6   7/18/2017 - Present      Health Maintenance        Date Due Completion Dates    DIABETES MONOFILAMENT / LE EXAM 3/16/1932 ---    RETINAL SCREENING 9/16/1949 ---    URINE ACR / MICROALBUMIN 9/16/1949 ---    IMM DTaP/Tdap/Td Vaccine (1 - Tdap) 9/16/1950 ---    COLONOSCOPY 9/16/1981 ---    IMM ZOSTER VACCINE 9/16/1991 ---    IMM INFLUENZA (1) 9/1/2017 9/20/2016    A1C SCREENING 2/9/2018 8/9/2017, 7/17/2017, 7/17/2017, 7/7/2015    FASTING LIPID PROFILE 2/22/2018 2/22/2017, 12/17/2015, 7/7/2015, 10/15/2014, 1/26/2012    SERUM CREATININE 8/9/2018 8/9/2017, 8/9/2017, 2/22/2017, 12/13/2016, 7/13/2015, 7/12/2015, 7/10/2015, 7/9/2015, 7/8/2015, 7/7/2015, 7/6/2015, 10/15/2014, 8/20/2013, 1/26/2012            Current Immunizations     13-VALENT PCV PREVNAR 10/5/2016    Influenza Vaccine Quad Inj (Preserved) 9/20/2016    Pneumococcal polysaccharide vaccine (PPSV-23) 7/7/2015  6:23 AM      Below and/or attached are the medications your provider expects you to take. Review all of your home medications and newly ordered medications with your provider and/or pharmacist. Follow medication instructions as directed by your provider and/or pharmacist. Please keep your medication list with you and share with your provider. Update the information when medications are discontinued, doses are  changed, or new medications (including over-the-counter products) are added; and carry medication information at all times in the event of emergency situations     Allergies:  METFORMIN - (reactions not documented)     PENICILLINS - Unspecified               Medications  Valid as of: August 16, 2017 -  3:18 PM    Generic Name Brand Name Tablet Size Instructions for use    Allopurinol (Tab) ZYLOPRIM 100 MG Take 1 Tab by mouth every day.        Aspirin (Tablet Delayed Response) aspirin 81 MG Take 81 mg by mouth.        Atenolol (Tab) TENORMIN 50 MG TAKE 1 TABLET BY MOUTH EVERY DAY        GlipiZIDE (Tab) GLUCOTROL 5 MG Take 1 Tab by mouth 2 times a day.        Rivastigmine (PATCH 24 HR) EXELON 9.5 MG/24HR Apply 1 Patch to skin as directed every day.        Rosuvastatin Calcium (Tab) CRESTOR 5 MG TAKE 1 TABLET BY MOUTH EVERY DAY        TraZODone HCl (Tab) DESYREL 50 MG Take 50 mg by mouth.        .                 Medicines prescribed today were sent to:     [facility] [address]    [address] [place] [address]    Phone: [phone] Fax: [phone]    Open 24 Hours?: No    [facility] - [address] [address]    Phone: [phone] Fax: [phone]    Open 24 Hours?: No      Medication refill instructions:       If your prescription bottle indicates you have medication refills left, it is not necessary to call your provider’s office. Please contact your pharmacy and they will refill your medication.    If your prescription bottle indicates you do not have any refills left, you may request refills at any time through one of the following ways: The online [employer] system (except Urgent Care), by calling your provider’s office, or by asking your pharmacy to contact your provider’s office with a refill request. Medication refills are processed only during regular  business hours and may not be available until the next business day. Your provider may request additional information or to have a follow-up visit with you prior to refilling your medication.   *Please Note: Medication refills are assigned a new Rx number when refilled electronically. Your pharmacy may indicate that no refills were authorized even though a new prescription for the same medication is available at the pharmacy. Please request the medicine by name with the pharmacy before contacting your provider for a refill.           MyChart Status: Patient Declined

## 2017-08-16 NOTE — PROGRESS NOTES
Device is working normally. No mode switching episodes.  Normal sensing of RA lead; unable to measure R waves; stable capture of RA and RV leads; stable impedances. Battery longevity is 6.7-7.2 years.  No changes are made today.    FU in 6 months for next PM check with me.    Collaborating MD: Elsa

## 2017-08-16 NOTE — PROGRESS NOTES
Subjective:   Moises Ordoñez is a 85 y.o. male with known history of sick sinus syndrome status post pacemaker, paroxysmal atrial fibrillation not on anticoagulation, hypertension, dyslipidemia, advanced dementia presenting today for follow-up evaluation of atrial fibrillation as well as pacemaker check    Patient has advanced dementia and unable to get much information. Patient denied any complaints of chest pain.      Past Medical History   Diagnosis Date   • Atrial fibrillation (CMS-HCC)     • Fatigue     • GOUT     • Hyperlipidemia     • Hypertension     • Hypokalemia     • Sick sinus syndrome (CMS-[facility])       1991, 1997, 2003.   • Prostate cancer (CMS-HCC) 1992     Status post radical prostatectomy.   • AV block, complete (CMS-HCC)    • Dementia without behavioral disturbance 7/25/2016     Past Surgical History   Procedure Laterality Date   • Pacemaker insertion  August 2013     Generator replacement with [employer] DR #[id number] implanted by Dr. Gamal Borja. Original device implanted in 1992.   • Inguinal hernia repair  1993         • Prostatectomy, radical retro  1992         • Tonsillectomy  1937         • Recovery  8/21/2013     Performed by Cath-Recovery Surgery at SURGERY SAME DAY [facility] ORS     Family History   Problem Relation Age of Onset   • Lung Disease Father      COPD   • Other Brother      Rheumatic fever     History   Smoking status   • Never Smoker    Smokeless tobacco   • Never Used     Allergies   Allergen Reactions   • Metformin      seizures   • Penicillins Unspecified     Pt unsure of reaction.     Outpatient Encounter Prescriptions as of 8/16/2017   Medication Sig Dispense Refill   • glipiZIDE (GLUCOTROL) 5 MG Tab Take 1 Tab by mouth 2 times a day. 60 Tab 3   • rosuvastatin (CRESTOR) 5 MG Tab TAKE 1 TABLET BY MOUTH EVERY DAY 30 Tab 3   • atenolol (TENORMIN) 50 MG Tab TAKE 1 TABLET BY MOUTH EVERY DAY 90 Tab 1   • rivastigmine (EXELON) 9.5 MG/24HR patch Apply 1 Patch to skin as  "directed every day.     • allopurinol (ZYLOPRIM) 100 MG Tab Take 1 Tab by mouth every day. 90 Tab 3   • aspirin 81 MG EC tablet Take 81 mg by mouth.     • trazodone (DESYREL) 50 MG Tab Take 50 mg by mouth.       No facility-administered encounter medications on file as of 8/16/2017.     Review of Systems   Unable to perform ROS: dementia   Cardiovascular: Negative for chest pain.   Psychiatric/Behavioral: Positive for memory loss.        Objective:   /60 mmHg  Pulse 85  Ht 1.829 m (6' 0.01\")  Wt 85.73 kg (189 lb)  BMI 25.63 kg/m2  SpO2 92%    Physical Exam   Constitutional: He appears well-developed and well-nourished.   Mild kyphoscoliosis.   HENT:   Head: Normocephalic and atraumatic.   Eyes: Conjunctivae and EOM are normal. Right eye exhibits no discharge. Left eye exhibits no discharge.   Neck: No JVD present. No tracheal deviation present. No thyroid mass present.   Cardiovascular: Normal rate, regular rhythm, S1 normal, S2 normal and normal pulses.  PMI is not displaced.  Exam reveals no gallop.    No murmur heard.  Pulses:       Carotid pulses are 2+ on the right side, and 2+ on the left side.       Radial pulses are 2+ on the right side, and 2+ on the left side.        Femoral pulses are 2+ on the right side, and 2+ on the left side.       Dorsalis pedis pulses are 2+ on the right side, and 2+ on the left side.   No peripheral edema.   Pulmonary/Chest: Effort normal and breath sounds normal. No respiratory distress. He has no wheezes. He has no rales.   Abdominal: Soft. Normal appearance. He exhibits no abdominal bruit. There is no hepatosplenomegaly. There is no tenderness.   Musculoskeletal: Normal range of motion. He exhibits no edema.        Thoracic back: He exhibits no tenderness and no spasm.   Neurological: He is alert.   He has severe short-term memory deficit.   Skin: No rash noted. No cyanosis. Nails show no clubbing.   Psychiatric: He has a normal mood and affect.   Results for OTTONIEL, " ANNI EDGE (MRN [id number]) as of 2017 15:55   2016    Sodium 140 141   Potassium 4.2 4.1   Chloride 105 104   Co2 31 30   Anion Gap 4.0 7.0   Glucose 111 (H) 93   Bun 17 14   Creatinine 1.00 0.80   GFR If Non African American >60 >60   Calcium 9.4 9.4   AST(SGOT) 22 25   ALT(SGPT) 19 20   Alkaline Phosphatase 74 74   Cholesterol,Tot  100   Triglycerides  90   HDL  36 (A)   LDL  46     EK/6/15  EKG personally reviewed by me.  AV paced at 70 bpm.    Assessment:     1. SSS s/p PPM  2. Paroxysmal atrial fibrillation  3. Dyslipidemia  4. HTN    Medical Decision Making:  Today's Assessment / Status / Plan:     1. Device interrogation from today showed no mode switching episodes.  Normal sensing of RA lead; unable to measure R waves; stable capture of RA and RV leads; stable impedances. Battery longevity is 6.7-7.2 years  2. No evidence of A. fib and last one year.  Secondary to advanced dementia will avoid anticoagulation for now.  If patient has further episodes of A. fib will discuss with family before initiating anticoagulation.  Continue aspirin.  3. LDL less than 70.  Continue Crestor.  Labs in one year  4. Blood pressure well controlled on atenolol.    Follow-up in 6 months with Agueda and in one year with me.  Labs in one year    Thank you for allowing me to participate in taking care of patient.    Kenna Tsai MD.

## 2017-08-16 NOTE — Clinical Note
[facility]   [address] [place] [address]  Phone: [phone]  Fax: [phone]              Moises Ordoñez  9/16/1931    Encounter Date: 8/16/2017    Kenna Hunter M.D.          PROGRESS NOTE:  Subjective:   Moises Ordoñez is a 85 y.o. male with known history of sick sinus syndrome status post pacemaker, paroxysmal atrial fibrillation not on anticoagulation, hypertension, dyslipidemia, advanced dementia presenting today for follow-up evaluation of atrial fibrillation as well as pacemaker check    Patient has advanced dementia and unable to get much information. Patient denied any complaints of chest pain.      Past Medical History   Diagnosis Date   • Atrial fibrillation (CMS-[facility])     • Fatigue     • GOUT     • Hyperlipidemia     • Hypertension     • Hypokalemia     • Sick sinus syndrome (CMS-[facility])       1991, 1997, 2003.   • Prostate cancer (CMS-[facility]) 1992     Status post radical prostatectomy.   • AV block, complete (CMS-[facility])    • Dementia without behavioral disturbance 7/25/2016     Past Surgical History   Procedure Laterality Date   • Pacemaker insertion  August 2013     Generator replacement with [employer] Accent DR #[id number] implanted by Dr. Gamal Borja. Original device implanted in 1992.   • Inguinal hernia repair  1993         • Prostatectomy, radical retro  1992         • Tonsillectomy  1937         • Recovery  8/21/2013     Performed by Cath-Recovery Surgery at SURGERY SAME DAY [facility]     Family History   Problem Relation Age of Onset   • Lung Disease Father      COPD   • Other Brother      Rheumatic fever     History   Smoking status   • Never Smoker    Smokeless tobacco   • Never Used     Allergies   Allergen Reactions   • Metformin      seizures   • Penicillins Unspecified     Pt unsure of reaction.     Outpatient Encounter Prescriptions as of 8/16/2017   Medication Sig Dispense Refill   • glipiZIDE  "(GLUCOTROL) 5 MG Tab Take 1 Tab by mouth 2 times a day. 60 Tab 3   • rosuvastatin (CRESTOR) 5 MG Tab TAKE 1 TABLET BY MOUTH EVERY DAY 30 Tab 3   • atenolol (TENORMIN) 50 MG Tab TAKE 1 TABLET BY MOUTH EVERY DAY 90 Tab 1   • rivastigmine (EXELON) 9.5 MG/24HR patch Apply 1 Patch to skin as directed every day.     • allopurinol (ZYLOPRIM) 100 MG Tab Take 1 Tab by mouth every day. 90 Tab 3   • aspirin 81 MG EC tablet Take 81 mg by mouth.     • trazodone (DESYREL) 50 MG Tab Take 50 mg by mouth.       No facility-administered encounter medications on file as of 8/16/2017.     Review of Systems   Unable to perform ROS: dementia   Cardiovascular: Negative for chest pain.   Psychiatric/Behavioral: Positive for memory loss.        Objective:   /60 mmHg  Pulse 85  Ht 1.829 m (6' 0.01\")  Wt 85.73 kg (189 lb)  BMI 25.63 kg/m2  SpO2 92%    Physical Exam   Constitutional: He appears well-developed and well-nourished.   Mild kyphoscoliosis.   HENT:   Head: Normocephalic and atraumatic.   Eyes: Conjunctivae and EOM are normal. Right eye exhibits no discharge. Left eye exhibits no discharge.   Neck: No JVD present. No tracheal deviation present. No thyroid mass present.   Cardiovascular: Normal rate, regular rhythm, S1 normal, S2 normal and normal pulses.  PMI is not displaced.  Exam reveals no gallop.    No murmur heard.  Pulses:       Carotid pulses are 2+ on the right side, and 2+ on the left side.       Radial pulses are 2+ on the right side, and 2+ on the left side.        Femoral pulses are 2+ on the right side, and 2+ on the left side.       Dorsalis pedis pulses are 2+ on the right side, and 2+ on the left side.   No peripheral edema.   Pulmonary/Chest: Effort normal and breath sounds normal. No respiratory distress. He has no wheezes. He has no rales.   Abdominal: Soft. Normal appearance. He exhibits no abdominal bruit. There is no hepatosplenomegaly. There is no tenderness.   Musculoskeletal: Normal range of " motion. He exhibits no edema.        Thoracic back: He exhibits no tenderness and no spasm.   Neurological: He is alert.   He has severe short-term memory deficit.   Skin: No rash noted. No cyanosis. Nails show no clubbing.   Psychiatric: He has a normal mood and affect.   Results for ANNI ALCAZAR (MRN [id number]) as of 2017 15:55   2016    Sodium 140 141   Potassium 4.2 4.1   Chloride 105 104   Co2 31 30   Anion Gap 4.0 7.0   Glucose 111 (H) 93   Bun 17 14   Creatinine 1.00 0.80   GFR If Non African American >60 >60   Calcium 9.4 9.4   AST(SGOT) 22 25   ALT(SGPT) 19 20   Alkaline Phosphatase 74 74   Cholesterol,Tot  100   Triglycerides  90   HDL  36 (A)   LDL  46     EK/6/15  EKG personally reviewed by me.  AV paced at 70 bpm.    Assessment:     1. SSS s/p PPM  2. Paroxysmal atrial fibrillation  3. Dyslipidemia  4. HTN    Medical Decision Making:  Today's Assessment / Status / Plan:     1. Device interrogation from today showed no mode switching episodes.  Normal sensing of RA lead; unable to measure R waves; stable capture of RA and RV leads; stable impedances. Battery longevity is 6.7-7.2 years  2. No evidence of A. fib and last one year.  Secondary to advanced dementia will avoid anticoagulation for now.  If patient has further episodes of A. fib will discuss with family before initiating anticoagulation.  Continue aspirin.  3. LDL less than 70.  Continue Crestor.  Labs in one year  4. Blood pressure well controlled on atenolol.    Follow-up in 6 months with Agueda and in one year with me.  Labs in one year    Thank you for allowing me to participate in taking care of patient.    Kenna Hunter. MD.      Shellie Borja M.D.  [address]  [address]  [facility] [address]  VIA In Basket

## 2017-08-17 ENCOUNTER — OFFICE VISIT (OUTPATIENT)
Dept: MEDICAL GROUP | Facility: MEDICAL CENTER | Age: 82
End: 2017-08-17
Payer: MEDICARE

## 2017-08-17 VITALS
DIASTOLIC BLOOD PRESSURE: 82 MMHG | SYSTOLIC BLOOD PRESSURE: 102 MMHG | BODY MASS INDEX: 25.6 KG/M2 | TEMPERATURE: 98.1 F | WEIGHT: 189 LBS | HEART RATE: 71 BPM | OXYGEN SATURATION: 96 % | HEIGHT: 72 IN

## 2017-08-17 DIAGNOSIS — I48.0 PAROXYSMAL ATRIAL FIBRILLATION (HCC): ICD-10-CM

## 2017-08-17 DIAGNOSIS — F03.90 DEMENTIA WITHOUT BEHAVIORAL DISTURBANCE, UNSPECIFIED DEMENTIA TYPE: ICD-10-CM

## 2017-08-17 DIAGNOSIS — E11.9 TYPE 2 DIABETES MELLITUS WITHOUT COMPLICATION, WITHOUT LONG-TERM CURRENT USE OF INSULIN (HCC): ICD-10-CM

## 2017-08-17 DIAGNOSIS — E78.5 DYSLIPIDEMIA: ICD-10-CM

## 2017-08-17 PROCEDURE — 99214 OFFICE O/P EST MOD 30 MIN: CPT | Performed by: FAMILY MEDICINE

## 2017-08-17 RX ORDER — GLIPIZIDE 5 MG/1
5 TABLET ORAL 2 TIMES DAILY
Qty: 60 TAB | Refills: 3 | Status: SHIPPED | OUTPATIENT
Start: 2017-08-17 | End: 2018-01-05 | Stop reason: SDUPTHER

## 2017-08-17 RX ORDER — ATENOLOL 50 MG/1
50 TABLET ORAL
Qty: 90 TAB | Refills: 1 | Status: SHIPPED | OUTPATIENT
Start: 2017-08-17 | End: 2018-02-23 | Stop reason: SDUPTHER

## 2017-08-17 RX ORDER — ROSUVASTATIN CALCIUM 5 MG/1
5 TABLET, COATED ORAL
Qty: 30 TAB | Refills: 3 | Status: SHIPPED | OUTPATIENT
Start: 2017-08-17 | End: 2017-12-14 | Stop reason: SDUPTHER

## 2017-08-17 NOTE — MR AVS SNAPSHOT
"        Moises Baezanetta   2017 11:00 AM   Office Visit   MRN: [id number]    Department:  [facility]   Dept Phone:  [phone]    Description:  Male : 1931   Provider:  Shellie Borja M.D.           Reason for Visit     Follow-Up           Allergies as of 2017     Allergen Noted Reactions    Metformin 07/15/2016       seizures    Penicillins 2015   Unspecified    Pt unsure of reaction.      You were diagnosed with     Type 2 diabetes mellitus without complication, without long-term current use of insulin (CMS-HCC)   [2206972]       Dyslipidemia   [799193]       Paroxysmal atrial fibrillation (CMS-HCC)   [483875]         Vital Signs     Blood Pressure Pulse Temperature Height Weight Body Mass Index    102/82 mmHg 71 36.7 °C (98.1 °F) 1.829 m (6' 0.01\") 85.73 kg (189 lb) 25.63 kg/m2    Oxygen Saturation Smoking Status                96% Never Smoker           Basic Information     Date Of Birth Sex Race Ethnicity Preferred Language    1931 Male White Non- English      Your appointments     2018 11:00 AM   PACER CHECK ONLY with MARKO Kimball   [facility] ([facility]    [address] [id number] [phone]              Problem List              ICD-10-CM Priority Class Noted - Resolved    Paroxysmal atrial fibrillation (CMS-HCC) I48.0 Medium  2012 - Present    Fatigue R53.83 Low  2012 - Present    Gout M10.9 Low  2012 - Present    Hyperlipidemia E78.5 High  2012 - Present    Essential hypertension I10 High  2012 - Present    Hypokalemia E87.6 Medium  2012 - Present    Presence of permanent cardiac pacemaker Z95.0 High  2012 - Present    Prostate cancer (CMS-[facility]) C61 Low  10/7/2013 - Present    Sick sinus syndrome (CMS-HCC) I49.5 High  10/7/2013 - Present    AV block I44.30 High  10/7/2013 - Present    Seizure (CMS-HCC) R56.9   2015 - Present   " Dementia without behavioral disturbance F03.90   7/25/2016 - Present    Type 2 diabetes mellitus (CMS-HCC) E11.9 Medium  7/25/2016 - Present    Meningioma (CMS-HCC) D32.9   7/7/2015 - Present    Frequent falls R29.6   7/18/2017 - Present      Health Maintenance        Date Due Completion Dates    DIABETES MONOFILAMENT / LE EXAM 3/16/1932 ---    RETINAL SCREENING 9/16/1949 ---    URINE ACR / MICROALBUMIN 9/16/1949 ---    IMM DTaP/Tdap/Td Vaccine (1 - Tdap) 9/16/1950 ---    COLONOSCOPY 9/16/1981 ---    IMM ZOSTER VACCINE 9/16/1991 ---    IMM INFLUENZA (1) 9/1/2017 9/20/2016    A1C SCREENING 2/9/2018 8/9/2017, 7/17/2017, 7/17/2017, 7/7/2015    FASTING LIPID PROFILE 2/22/2018 2/22/2017, 12/17/2015, 7/7/2015, 10/15/2014, 1/26/2012    SERUM CREATININE 8/9/2018 8/9/2017, 8/9/2017, 2/22/2017, 12/13/2016, 7/13/2015, 7/12/2015, 7/10/2015, 7/9/2015, 7/8/2015, 7/7/2015, 7/6/2015, 10/15/2014, 8/20/2013, 1/26/2012            Current Immunizations     13-VALENT PCV PREVNAR 10/5/2016    Influenza Vaccine Quad Inj (Preserved) 9/20/2016    Pneumococcal polysaccharide vaccine (PPSV-23) 7/7/2015  6:23 AM      Below and/or attached are the medications your provider expects you to take. Review all of your home medications and newly ordered medications with your provider and/or pharmacist. Follow medication instructions as directed by your provider and/or pharmacist. Please keep your medication list with you and share with your provider. Update the information when medications are discontinued, doses are changed, or new medications (including over-the-counter products) are added; and carry medication information at all times in the event of emergency situations     Allergies:  METFORMIN - (reactions not documented)     PENICILLINS - Unspecified               Medications  Valid as of: August 17, 2017 - 11:03 AM    Generic Name Brand Name Tablet Size Instructions for use    Allopurinol (Tab) ZYLOPRIM 100 MG Take 1 Tab by mouth every day.         Aspirin (Tablet Delayed Response) aspirin 81 MG Take 81 mg by mouth.        Atenolol (Tab) TENORMIN 50 MG Take 1 Tab by mouth every day.        GlipiZIDE (Tab) GLUCOTROL 5 MG Take 1 Tab by mouth 2 times a day.        Rivastigmine (PATCH 24 HR) EXELON 9.5 MG/24HR Apply 1 Patch to skin as directed every day.        Rosuvastatin Calcium (Tab) CRESTOR 5 MG Take 1 Tab by mouth every day.        TraZODone HCl (Tab) DESYREL 50 MG Take 50 mg by mouth.        .                 Medicines prescribed today were sent to:     [facility] [address] [facility]  [address]    [facility] [address] [facility] [address]    Phone: [phone] Fax: [phone]    Open 24 Hours?: No    [facility] - [place] [id number] [address]    Phone: [phone] Fax: [phone]    Open 24 Hours?: No      Medication refill instructions:       If your prescription bottle indicates you have medication refills left, it is not necessary to call your provider’s office. Please contact your pharmacy and they will refill your medication.    If your prescription bottle indicates you do not have any refills left, you may request refills at any time through one of the following ways: The online [employer] system (except Urgent Care), by calling your provider’s office, or by asking your pharmacy to contact your provider’s office with a refill request. Medication refills are processed only during regular business hours and may not be available until the next business day. Your provider may request additional information or to have a follow-up visit with you prior to refilling your medication.   *Please Note: Medication refills are assigned a new Rx number when refilled electronically. Your pharmacy may indicate that no refills were authorized even though a new prescription for the same medication is available at the pharmacy. Please request the  medicine by name with the pharmacy before contacting your provider for a refill.           MyChart Status: Patient Declined

## 2017-08-25 NOTE — PROGRESS NOTES
Subjective:     Chief Complaint   Patient presents with   • Follow-Up       Moises Ordoñez is a 85 y.o. male here today for evaluation and management of:    Type 2 diabetes mellitus (CMS-HCC)  Stable. Currently taking glipizide 5 mg twice a day as directed.  Denies side effects or hypoglycemic events.  He is not monitoring blood sugar regularly at home.  Reports diet is fair  He is exercising regularly.  Last eye exam: 6 months ago  Denies polyuria, polydipsia, blurred vision, or neuropathies.      Paroxysmal atrial fibrillation (CMS-[facility])  Patient's atrial fibrillation has been well controlled with his atenolol 50 mg. He denies any palpitation or shortness of breath. He needs a refill of this medication.    Dementia without behavioral disturbance  Patient is having worsening of his dementia. His son and daughter-in-law have moved in to help his wife with his care. He several months ago when out for a walk and got lost and the police had to be called. His wife reports that the other night he got up in the middle the night went and laid on top of the table and would not get down. He continues to see the neurologist.       Allergies   Allergen Reactions   • Metformin      seizures   • Penicillins Unspecified     Pt unsure of reaction.       Current medicines (including changes today)  Current Outpatient Prescriptions   Medication Sig Dispense Refill   • glipiZIDE (GLUCOTROL) 5 MG Tab Take 1 Tab by mouth 2 times a day. 60 Tab 3   • rosuvastatin (CRESTOR) 5 MG Tab Take 1 Tab by mouth every day. 30 Tab 3   • atenolol (TENORMIN) 50 MG Tab Take 1 Tab by mouth every day. 90 Tab 1   • rivastigmine (EXELON) 9.5 MG/24HR patch Apply 1 Patch to skin as directed every day.     • allopurinol (ZYLOPRIM) 100 MG Tab Take 1 Tab by mouth every day. 90 Tab 3   • aspirin 81 MG EC tablet Take 81 mg by mouth.     • trazodone (DESYREL) 50 MG Tab Take 50 mg by mouth.       No current facility-administered medications for this visit.  "      He  has a past medical history of Atrial fibrillation (CMS-HCC) ( ); Fatigue ( ); GOUT ( ); Hyperlipidemia ( ); Hypertension ( ); Hypokalemia ( ); Sick sinus syndrome (CMS-HCC) ( ); Prostate cancer (CMS-HCC) (1992); AV block, complete (CMS-HCC); and Dementia without behavioral disturbance (7/25/2016).    Patient Active Problem List    Diagnosis Date Noted   • Sick sinus syndrome (CMS-HCC) 10/07/2013     Priority: High   • AV block 10/07/2013     Priority: High   • Hyperlipidemia 01/16/2012     Priority: High   • Essential hypertension 01/16/2012     Priority: High   • Presence of permanent cardiac pacemaker 01/16/2012     Priority: High   • Type 2 diabetes mellitus (CMS-HCC) 07/25/2016     Priority: Medium   • Paroxysmal atrial fibrillation (CMS-HCC) 01/16/2012     Priority: Medium   • Hypokalemia 01/16/2012     Priority: Medium   • Prostate cancer (CMS-HCC) 10/07/2013     Priority: Low   • Fatigue 01/16/2012     Priority: Low   • Gout 01/16/2012     Priority: Low   • Frequent falls 07/18/2017   • Dementia without behavioral disturbance 07/25/2016   • Meningioma (CMS-HCC) 07/07/2015   • Seizure (CMS-HCC) 07/06/2015       ROS   No fever or chills.  No nausea or vomiting.  No chest pain or palpitations.  No cough or SOB.  No pain with urination or hematuria.  No black or bloody stools.       Objective:     Blood pressure 102/82, pulse 71, temperature 36.7 °C (98.1 °F), height 1.829 m (6' 0.01\"), weight 85.73 kg (189 lb), SpO2 96 %. Body mass index is 25.63 kg/(m^2).   Physical Exam:  Well developed, well nourished.  Alert, oriented in no acute distress.  Eye contact is good, speech goal directed, affect calm  Eyes: conjunctiva non-injected, sclera non-icteric.  Neck Supple.  No adenopathy or masses in the neck or supraclavicular regions. No thyromegaly  Lungs: clear to auscultation bilaterally with good excursion. No wheezes or rhonchi  CV: regular rate and rhythm. No murmur  Abdomen: soft, nontender, no masses " or organomegaly.  No rebound or guarding  Ext: no edema, color normal, vascularity normal, temperature normal          Assessment and Plan:   The following treatment plan was discussed    1. Type 2 diabetes mellitus without complication, without long-term current use of insulin (CMS-[facility])  Good control. Continue current medications  - glipiZIDE (GLUCOTROL) 5 MG Tab; Take 1 Tab by mouth 2 times a day.  Dispense: 60 Tab; Refill: 3    2. Dyslipidemia  Good control. Continue current medications  - rosuvastatin (CRESTOR) 5 MG Tab; Take 1 Tab by mouth every day.  Dispense: 30 Tab; Refill: 3    3. Paroxysmal atrial fibrillation (CMS-[facility])  Good control. Continue atenolol    4. Dementia without behavioral disturbance, unspecified dementia type  Follow-up with neurology as scheduled. Safety reviewed.    Any change or worsening of signs or symptoms, patient encouraged to follow-up or report to the emergency room for further evaluation. Patient understands and agrees.    Followup: Return in about 3 months (around 11/17/2017).

## 2017-08-25 NOTE — ASSESSMENT & PLAN NOTE
Patient's atrial fibrillation has been well controlled with his atenolol 50 mg. He denies any palpitation or shortness of breath. He needs a refill of this medication.

## 2017-08-25 NOTE — ASSESSMENT & PLAN NOTE
Patient is having worsening of his dementia. His son and daughter-in-law have moved in to help his wife with his care. He several months ago when out for a walk and got lost and the police had to be called. His wife reports that the other night he got up in the middle the night went and laid on top of the table and would not get down. He continues to see the neurologist.

## 2017-08-25 NOTE — ASSESSMENT & PLAN NOTE
Stable. Currently taking glipizide 5 mg twice a day as directed.  Denies side effects or hypoglycemic events.  He is not monitoring blood sugar regularly at home.  Reports diet is fair  He is exercising regularly.  Last eye exam: 6 months ago  Denies polyuria, polydipsia, blurred vision, or neuropathies.

## 2017-09-01 RX ORDER — ALLOPURINOL 100 MG/1
TABLET ORAL
Qty: 90 TAB | Refills: 3 | Status: SHIPPED | OUTPATIENT
Start: 2017-09-01

## 2017-12-14 DIAGNOSIS — E78.5 DYSLIPIDEMIA: ICD-10-CM

## 2017-12-14 RX ORDER — ROSUVASTATIN CALCIUM 5 MG/1
TABLET, COATED ORAL
Qty: 30 TAB | Refills: 6 | Status: SHIPPED | OUTPATIENT
Start: 2017-12-14 | End: 2018-02-08 | Stop reason: SDUPTHER

## 2018-01-05 DIAGNOSIS — E11.9 TYPE 2 DIABETES MELLITUS WITHOUT COMPLICATION, WITHOUT LONG-TERM CURRENT USE OF INSULIN (HCC): ICD-10-CM

## 2018-01-05 RX ORDER — GLIPIZIDE 5 MG/1
5 TABLET ORAL 2 TIMES DAILY
Qty: 60 TAB | Refills: 3 | Status: SHIPPED | OUTPATIENT
Start: 2018-01-05

## 2018-01-05 NOTE — TELEPHONE ENCOUNTER
Glipizide    Was the patient seen in the last year in this department? Yes Last 8/17/17    Does patient have an active prescription for medications requested? No     Received Request Via: Patient

## 2018-02-08 DIAGNOSIS — E78.5 DYSLIPIDEMIA: ICD-10-CM

## 2018-02-09 RX ORDER — ROSUVASTATIN CALCIUM 5 MG/1
5 TABLET, COATED ORAL
Qty: 30 TAB | Refills: 6 | Status: SHIPPED | OUTPATIENT
Start: 2018-02-09 | End: 2018-03-20 | Stop reason: SDUPTHER

## 2018-02-12 ENCOUNTER — HOSPITAL ENCOUNTER (OUTPATIENT)
Dept: LAB | Facility: MEDICAL CENTER | Age: 83
End: 2018-02-12
Attending: SPECIALIST
Payer: MEDICARE

## 2018-02-12 LAB
ALBUMIN SERPL BCP-MCNC: 4.1 G/DL (ref 3.2–4.9)
ALBUMIN/GLOB SERPL: 1.2 G/DL
ALP SERPL-CCNC: 86 U/L (ref 30–99)
ALT SERPL-CCNC: 27 U/L (ref 2–50)
ANION GAP SERPL CALC-SCNC: 9 MMOL/L (ref 0–11.9)
AST SERPL-CCNC: 32 U/L (ref 12–45)
BILIRUB SERPL-MCNC: 0.8 MG/DL (ref 0.1–1.5)
BUN SERPL-MCNC: 16 MG/DL (ref 8–22)
CALCIUM SERPL-MCNC: 9.6 MG/DL (ref 8.5–10.5)
CHLORIDE SERPL-SCNC: 103 MMOL/L (ref 96–112)
CO2 SERPL-SCNC: 29 MMOL/L (ref 20–33)
CREAT SERPL-MCNC: 0.88 MG/DL (ref 0.5–1.4)
GLOBULIN SER CALC-MCNC: 3.5 G/DL (ref 1.9–3.5)
GLUCOSE SERPL-MCNC: 89 MG/DL (ref 65–99)
POTASSIUM SERPL-SCNC: 4.1 MMOL/L (ref 3.6–5.5)
PROT SERPL-MCNC: 7.6 G/DL (ref 6–8.2)
SODIUM SERPL-SCNC: 141 MMOL/L (ref 135–145)

## 2018-02-12 PROCEDURE — 80053 COMPREHEN METABOLIC PANEL: CPT

## 2018-02-12 PROCEDURE — 36415 COLL VENOUS BLD VENIPUNCTURE: CPT

## 2018-02-21 ENCOUNTER — NON-PROVIDER VISIT (OUTPATIENT)
Dept: CARDIOLOGY | Facility: PHYSICIAN GROUP | Age: 83
End: 2018-02-21
Payer: MEDICARE

## 2018-02-21 VITALS
DIASTOLIC BLOOD PRESSURE: 78 MMHG | SYSTOLIC BLOOD PRESSURE: 110 MMHG | HEART RATE: 70 BPM | WEIGHT: 200 LBS | BODY MASS INDEX: 27.09 KG/M2 | OXYGEN SATURATION: 92 % | HEIGHT: 72 IN

## 2018-02-21 DIAGNOSIS — I49.5 SICK SINUS SYNDROME (HCC): ICD-10-CM

## 2018-02-21 DIAGNOSIS — I44.30 AV BLOCK: ICD-10-CM

## 2018-02-21 DIAGNOSIS — I48.0 PAROXYSMAL ATRIAL FIBRILLATION (HCC): ICD-10-CM

## 2018-02-21 DIAGNOSIS — Z95.0 PRESENCE OF PERMANENT CARDIAC PACEMAKER: ICD-10-CM

## 2018-02-21 PROCEDURE — 93288 INTERROG EVL PM/LDLS PM IP: CPT | Performed by: NURSE PRACTITIONER

## 2018-02-21 RX ORDER — TRAZODONE HYDROCHLORIDE 50 MG/1
50 TABLET ORAL
COMMUNITY
Start: 2016-03-09 | End: 2018-02-21

## 2018-02-21 NOTE — PROGRESS NOTES
"Patient's wife states she periodically hears a \"popping\" sound from his throat, and wonders if this is the pacemaker.    Unable to reproduce any popping noises, and patient and his wife are reassured that PM does not emit any sounds.    Device is working normally. No mode switching episodes.  Normal sensing and capture of RA and RV leads; stable impedances. Battery longevity is 5.8-6.4 years.  No changes are made today.    FU in 6 months for next PM check with me.    He does have FU with Dr. Mustafa to establish care next month, and should keep this appointment.    Urged to FU with PCP and/or GI to further evaluate \"popping\" sound.    Collaborating MD: AGUSTIN Dumont  "

## 2018-02-24 RX ORDER — ATENOLOL 50 MG/1
50 TABLET ORAL
Qty: 90 TAB | Refills: 1 | Status: SHIPPED | OUTPATIENT
Start: 2018-02-24

## 2018-03-06 ENCOUNTER — OFFICE VISIT (OUTPATIENT)
Dept: MEDICAL GROUP | Facility: MEDICAL CENTER | Age: 83
End: 2018-03-06
Payer: MEDICARE

## 2018-03-06 VITALS
RESPIRATION RATE: 16 BRPM | TEMPERATURE: 97.4 F | HEART RATE: 70 BPM | SYSTOLIC BLOOD PRESSURE: 116 MMHG | OXYGEN SATURATION: 94 % | DIASTOLIC BLOOD PRESSURE: 60 MMHG

## 2018-03-06 DIAGNOSIS — J40 BRONCHITIS: ICD-10-CM

## 2018-03-06 PROBLEM — R05.9 COUGH: Status: ACTIVE | Noted: 2018-03-06

## 2018-03-06 PROCEDURE — 99214 OFFICE O/P EST MOD 30 MIN: CPT | Performed by: FAMILY MEDICINE

## 2018-03-06 RX ORDER — AZITHROMYCIN 250 MG/1
TABLET, FILM COATED ORAL
Qty: 6 TAB | Refills: 0 | Status: SHIPPED | OUTPATIENT
Start: 2018-03-06 | End: 2018-03-11

## 2018-03-06 NOTE — PATIENT INSTRUCTIONS

## 2018-03-06 NOTE — PROGRESS NOTES
Subjective:     Chief Complaint   Patient presents with   • Chest Pressure       Moises Ordoñez is a 86 y.o. male here today for evaluation and management of:    Bronchitis  Illness: 3 weeks of illness including: nasal congestion, green/purulent rhinorrhea, cough, wheezing the patient's wife appears at night. Patient is a poor historian and his wife must be relied on for most of the history.  Symptoms negative for fever,   Treatments tried: none   Since onset, symptoms are worse   Similarly ill exposures: yes, his wife is also sick.  Medical history negative for asthma  He  reports that he has never smoked. He has never used smokeless tobacco.         Allergies   Allergen Reactions   • Metformin      seizures   • Penicillins Unspecified     Pt unsure of reaction.       Current medicines (including changes today)  Current Outpatient Prescriptions   Medication Sig Dispense Refill   • azithromycin (ZITHROMAX) 250 MG Tab 2 tabs by mouth day 1, 1 tab by mouth days 2-5 6 Tab 0   • atenolol (TENORMIN) 50 MG Tab TAKE 1 TAB BY MOUTH EVERY DAY. 90 Tab 1   • rosuvastatin (CRESTOR) 5 MG Tab Take 1 Tab by mouth every day. 30 Tab 6   • glipiZIDE (GLUCOTROL) 5 MG Tab Take 1 Tab by mouth 2 times a day. 60 Tab 3   • allopurinol (ZYLOPRIM) 100 MG Tab TAKE 1 TABLET BY MOUTH EVERY DAY 90 Tab 3   • aspirin 81 MG EC tablet Take 81 mg by mouth.     • trazodone (DESYREL) 50 MG Tab Take 50 mg by mouth.       No current facility-administered medications for this visit.        He  has a past medical history of Atrial fibrillation (CMS-[facility]) ( ); AV block, complete (CMS-[facility]); Dementia without behavioral disturbance (7/25/2016); Fatigue ( ); GOUT ( ); Hyperlipidemia ( ); Hypertension ( ); Hypokalemia ( ); Prostate cancer (CMS-[facility]) (1992); and Sick sinus syndrome (CMS-[facility]) ( ).    Patient Active Problem List    Diagnosis Date Noted   • Sick sinus syndrome (CMS-[facility]) 10/07/2013     Priority: High   • AV block 10/07/2013     Priority: High   •  Hyperlipidemia 01/16/2012     Priority: High   • Essential hypertension 01/16/2012     Priority: High   • Presence of permanent cardiac pacemaker 01/16/2012     Priority: High   • Frequent falls 07/18/2017     Priority: Medium   • Dementia without behavioral disturbance 07/25/2016     Priority: Medium   • Type 2 diabetes mellitus (CMS-HCC) 07/25/2016     Priority: Medium   • Paroxysmal atrial fibrillation (CMS-HCC) 01/16/2012     Priority: Medium   • Hypokalemia 01/16/2012     Priority: Medium   • Prostate cancer (CMS-HCC) 10/07/2013     Priority: Low   • Fatigue 01/16/2012     Priority: Low   • Gout 01/16/2012     Priority: Low   • Bronchitis 03/06/2018   • Meningioma (CMS-HCC) 07/07/2015   • Seizure (CMS-HCC) 07/06/2015       ROS   No fever or chills.  No nausea or vomiting.  No chest pain or palpitations.  No SOB.  No pain with urination or hematuria.  No black or bloody stools.       Objective:     Blood pressure 116/60, pulse 70, temperature 36.3 °C (97.4 °F), resp. rate 16, SpO2 94 %. There is no height or weight on file to calculate BMI.   Physical Exam:  Well developed, well nourished.  Alert, oriented in no acute distress.  Eye contact is good, speech goal directed, affect calm  Eyes: conjunctiva non-injected, sclera non-icteric.  Ears: Pinna normal. TM pearly gray.   Nose: Nares are patent.  Erythematous mucosa with yellow discharge  Mouth: Oral mucous membranes pink and moist with no lesions. Mild diffuse erythema of the posterior pharynx   Neck Supple.  No adenopathy or masses in the neck or supraclavicular regions. No thyromegaly  Lungs: clear to auscultation bilaterally with good excursion. No wheezes or rhonchi  CV: regular rate and rhythm. No murmur          Assessment and Plan:   The following treatment plan was discussed    1. Bronchitis  Increase fluids and rest. Call if not improving  - azithromycin (ZITHROMAX) 250 MG Tab; 2 tabs by mouth day 1, 1 tab by mouth days 2-5  Dispense: 6 Tab; Refill:  0    Any change or worsening of signs or symptoms, patient encouraged to follow-up or report to the emergency room for further evaluation. Patient understands and agrees.    Followup: Return if symptoms worsen or fail to improve.

## 2018-03-06 NOTE — ASSESSMENT & PLAN NOTE
Illness: 3 weeks of illness including: nasal congestion, green/purulent rhinorrhea, cough, wheezing the patient's wife appears at night. Patient is a poor historian and his wife must be relied on for most of the history.  Symptoms negative for fever,   Treatments tried: none   Since onset, symptoms are worse   Similarly ill exposures: yes, his wife is also sick.  Medical history negative for asthma  He  reports that he has never smoked. He has never used smokeless tobacco.

## 2018-03-20 DIAGNOSIS — E78.5 DYSLIPIDEMIA: ICD-10-CM

## 2018-03-20 RX ORDER — ROSUVASTATIN CALCIUM 5 MG/1
5 TABLET, COATED ORAL
Qty: 30 TAB | Refills: 6 | Status: SHIPPED | OUTPATIENT
Start: 2018-03-20

## 2018-03-20 NOTE — TELEPHONE ENCOUNTER
Was the patient seen in the last year in this department? Yes     Does patient have an active prescription for medications requested? No     Received Request Via: Patient     Pt updated Pharmacy on file, Pt is out of this medication.

## 2018-03-28 ENCOUNTER — OFFICE VISIT (OUTPATIENT)
Dept: CARDIOLOGY | Facility: PHYSICIAN GROUP | Age: 83
End: 2018-03-28
Payer: MEDICARE

## 2018-03-28 VITALS
DIASTOLIC BLOOD PRESSURE: 60 MMHG | OXYGEN SATURATION: 93 % | WEIGHT: 193 LBS | HEIGHT: 72 IN | BODY MASS INDEX: 26.14 KG/M2 | HEART RATE: 89 BPM | SYSTOLIC BLOOD PRESSURE: 130 MMHG

## 2018-03-28 DIAGNOSIS — Z95.0 PRESENCE OF PERMANENT CARDIAC PACEMAKER: ICD-10-CM

## 2018-03-28 DIAGNOSIS — I51.9 SYSTOLIC DYSFUNCTION: ICD-10-CM

## 2018-03-28 DIAGNOSIS — I49.5 SICK SINUS SYNDROME (HCC): Primary | ICD-10-CM

## 2018-03-28 DIAGNOSIS — I10 ESSENTIAL HYPERTENSION: ICD-10-CM

## 2018-03-28 DIAGNOSIS — E78.2 MIXED HYPERLIPIDEMIA: ICD-10-CM

## 2018-03-28 DIAGNOSIS — I44.30 AV BLOCK: ICD-10-CM

## 2018-03-28 PROCEDURE — 99214 OFFICE O/P EST MOD 30 MIN: CPT | Performed by: INTERNAL MEDICINE

## 2018-03-28 PROCEDURE — 93000 ELECTROCARDIOGRAM COMPLETE: CPT | Performed by: INTERNAL MEDICINE

## 2018-03-28 ASSESSMENT — ENCOUNTER SYMPTOMS: MEMORY LOSS: 1

## 2018-03-28 NOTE — PROGRESS NOTES
Chief Complaint   Patient presents with   • HTN (Controlled)       Subjective:   Moises Ordoñez is an 86 -year-old man with dementia followed in cardiology clinic for sick sinus syndrome status post pacemaker implantation ([employer] with generator change 8/2013, no mode switching noted for years on serial device interrogation).    He is doing well today, and has no cardiovascular complaints. His daughter-in-law accompanies him to his visit, and relates that she has been sleeping adjacent to his room and has not noted any popping sounds at night. His wife previously was concerned about a popping sound that she felt may have come from his pacemaker generator. Serial device interrogations are totally normal.    Past Medical History:   Diagnosis Date   • Atrial fibrillation (CMS-HCC)     • AV block, complete (CMS-HCC)    • Dementia without behavioral disturbance 7/25/2016   • Fatigue     • GOUT     • Hyperlipidemia     • Hypertension     • Hypokalemia     • Prostate cancer (CMS-HCC) 1992    Status post radical prostatectomy.   • Sick sinus syndrome (CMS-HCC)      1991, 1997, 2003.     Past Surgical History:   Procedure Laterality Date   • RECOVERY  8/21/2013    Performed by Cath-Recovery Surgery at SURGERY SAME DAY [facility] ORS   • PACEMAKER INSERTION  August 2013    Generator replacement with [employer] #[id number] implanted by Dr. Gamal Borja. Original device implanted in 1992.   • INGUINAL HERNIA REPAIR  1993        • PROSTATECTOMY, RADICAL RETRO  1992        • TONSILLECTOMY  1937          Family History   Problem Relation Age of Onset   • Lung Disease Father      COPD   • Other Brother      Rheumatic fever     Social History     Social History   • Marital status:      Spouse name: N/A   • Number of children: N/A   • Years of education: N/A     Occupational History   • Not on file.     Social History Main Topics   • Smoking status: Never Smoker   • Smokeless tobacco: Never Used   • Alcohol use  Yes      Comment: 1 a week   • Drug use: No   • Sexual activity: Not Currently     Partners: Female     Other Topics Concern   • Not on file     Social History Narrative   • No narrative on file     Allergies   Allergen Reactions   • Metformin      seizures   • Penicillins Unspecified     Pt unsure of reaction.     Outpatient Encounter Prescriptions as of 3/28/2018   Medication Sig Dispense Refill   • rosuvastatin (CRESTOR) 5 MG Tab Take 1 Tab by mouth every day. 30 Tab 6   • atenolol (TENORMIN) 50 MG Tab TAKE 1 TAB BY MOUTH EVERY DAY. 90 Tab 1   • glipiZIDE (GLUCOTROL) 5 MG Tab Take 1 Tab by mouth 2 times a day. 60 Tab 3   • allopurinol (ZYLOPRIM) 100 MG Tab TAKE 1 TABLET BY MOUTH EVERY DAY 90 Tab 3   • aspirin 81 MG EC tablet Take 81 mg by mouth.     • trazodone (DESYREL) 50 MG Tab Take 50 mg by mouth.       No facility-administered encounter medications on file as of 3/28/2018.      Review of Systems   Psychiatric/Behavioral: Positive for memory loss.   All other systems reviewed and are negative.       Objective:   /60   Pulse 89   Ht 1.829 m (6')   Wt 87.5 kg (193 lb)   SpO2 93%   BMI 26.18 kg/m²     Physical Exam   Constitutional: He is oriented to person, place, and time. He appears well-developed and well-nourished. No distress.   Pleasant, elderly man accompanied by his daughter-in-law in no distress   HENT:   Bruising under his mandible anteriorly in zone 2 of his neck (recent tooth extraction)   Eyes: EOM are normal. Pupils are equal, round, and reactive to light.   Neck: No JVD present.   Cardiovascular: Normal rate and regular rhythm.  Exam reveals no gallop and no friction rub.    No murmur heard.  Pacemaker generator noted without surrounding signs of infection. No carotid bruits appreciated.   Pulmonary/Chest: Effort normal and breath sounds normal. No respiratory distress. He has no wheezes. He has no rales.   Abdominal: Soft. Bowel sounds are normal. He exhibits no distension.    Musculoskeletal: He exhibits no edema (no lower extremity edema bilaterally).   Neurological: He is alert and oriented to person, place, and time.   Skin: Skin is warm and dry. No rash noted. He is not diaphoretic. No erythema. No pallor.   Psychiatric: He has a normal mood and affect. Judgment and thought content normal.     Lab Results   Component Value Date/Time    WBC 5.3 08/09/2017 11:15 AM    WBC 5.1 01/26/2012 08:29 AM    RBC 5.28 08/09/2017 11:15 AM    RBC 5.12 01/26/2012 08:29 AM    HEMOGLOBIN 17.5 08/09/2017 11:15 AM    HEMATOCRIT 52.8 (H) 08/09/2017 11:15 AM    .0 (H) 08/09/2017 11:15 AM    MCV 96 01/26/2012 08:29 AM    MCH 33.1 (H) 08/09/2017 11:15 AM    MCH 33.6 01/26/2012 08:29 AM    MCHC 33.1 (L) 08/09/2017 11:15 AM    MPV 10.5 08/09/2017 11:15 AM        Lab Results   Component Value Date/Time    SODIUM 141 02/12/2018 10:32 AM    POTASSIUM 4.1 02/12/2018 10:32 AM    CHLORIDE 103 02/12/2018 10:32 AM    CO2 29 02/12/2018 10:32 AM    GLUCOSE 89 02/12/2018 10:32 AM    BUN 16 02/12/2018 10:32 AM    CREATININE 0.88 02/12/2018 10:32 AM    CREATININE 0.93 01/26/2012 08:29 AM    BUNCREATRAT 15 10/15/2014 09:01 AM    BUNCREATRAT 17 01/26/2012 08:29 AM        Lab Results   Component Value Date/Time    ASTSGOT 32 02/12/2018 10:32 AM    ALTSGPT 27 02/12/2018 10:32 AM        Lab Results   Component Value Date/Time    CHOLSTRLTOT 100 02/22/2017 02:26 PM    LDL 46 02/22/2017 02:26 PM    HDL 36 (A) 02/22/2017 02:26 PM    TRIGLYCERIDE 90 02/22/2017 02:26 PM         No results found for this or any previous visit.    Echocardiogram, 2008: Left ventricular ejection fraction 40%, asymmetric septal hypertrophy with akinesis of the apex. There is aortic sclerosis without stenosis or regurgitation. His estimated RVSP was 25-30 mmHg. Left atrial volume index was 26.8 mL/meter squared    Assessment:     1. Sick sinus syndrome (CMS-HCC)     2. Essential hypertension  EKG   3. Presence of permanent cardiac pacemaker      4. Mixed hyperlipidemia         Medical Decision Making:  Today's Assessment / Status / Plan:     He is doing very well today, euvolemic on physical examination. Again, he has had no atrial fibrillation on serial device interrogations. As such, I think the most appropriate approach to stroke prevention is the use of aspirin. He remains on Crestor, and atenolol. I would not change that medical regimen at this time.    Richard Dumont MD  Cardiologist, [facility] [place]     Return in 1 year (on 3/28/2019) for 6 months with NP, 1  year with me.

## 2018-03-28 NOTE — PATIENT INSTRUCTIONS
There has been no significant episodes of atrial fibrillation on pacemaker checks for years.  As such, the risk of stroke is not elevated and aspirin is most appropriate.    Cholesterol levels and labs all look very good.    Blood pressure is well controlled.    The pacemaker by recent and previous checks is functioning wonderfully.

## 2018-03-28 NOTE — LETTER
Name:          Moises Ordoñez   YOB: 1931  Date:     03/28/2018      Shellie Borja M.D.  [address]  [facility] [address]     Richard Dumont MD  [address], [address]  [place] [address]  Phone: [phone]  Back Line: [phone]  Fax: [phone]  E-mail: [email or web address]   Dear Dr. Borja,    We had the pleasure of seeing your patient, Moises Ordoñez, in Cardiology Clinic at [facility] today.    As you know, he is an 86-year-old man with dementia followed in cardiology clinic for sick sinus syndrome status post pacemaker implantation ([employer] with generator change 8/2013, no mode switching noted for years on serial device interrogation).    He is doing very well today, euvolemic on physical examination. Again, he has had no atrial fibrillation on serial device interrogations. As such, I think the most appropriate approach to stroke prevention is the use of aspirin. He remains on Crestor, and atenolol. I would not change that medical regimen at this time.    Return in 1 year (on 3/28/2019) for 6 months with NP, 1  year with me.    Thank you for the referral and please do not hesitate to contact me at any time. My contact information is listed above.    This note was dictated using Dragon speech recognition software.     A full note including my physical examination and a full list of rectified medications is available in our medical record, and can be faxed as well.    Richard Dumont MD  Cardiologist  [facility]

## 2018-06-26 ENCOUNTER — OFFICE VISIT (OUTPATIENT)
Dept: MEDICAL GROUP | Facility: MEDICAL CENTER | Age: 83
End: 2018-06-26
Payer: MEDICARE

## 2018-06-26 VITALS
TEMPERATURE: 97.6 F | SYSTOLIC BLOOD PRESSURE: 136 MMHG | WEIGHT: 199 LBS | OXYGEN SATURATION: 95 % | DIASTOLIC BLOOD PRESSURE: 84 MMHG | HEIGHT: 72 IN | BODY MASS INDEX: 26.95 KG/M2 | RESPIRATION RATE: 20 BRPM | HEART RATE: 68 BPM

## 2018-06-26 DIAGNOSIS — Z95.0 PRESENCE OF PERMANENT CARDIAC PACEMAKER: ICD-10-CM

## 2018-06-26 DIAGNOSIS — Z13.0 SCREENING FOR DEFICIENCY ANEMIA: ICD-10-CM

## 2018-06-26 DIAGNOSIS — I10 ESSENTIAL HYPERTENSION: ICD-10-CM

## 2018-06-26 DIAGNOSIS — E78.2 MIXED HYPERLIPIDEMIA: ICD-10-CM

## 2018-06-26 DIAGNOSIS — G47.01 INSOMNIA DUE TO MEDICAL CONDITION: ICD-10-CM

## 2018-06-26 DIAGNOSIS — R26.89 IMBALANCE: ICD-10-CM

## 2018-06-26 DIAGNOSIS — D32.9 MENINGIOMA (HCC): ICD-10-CM

## 2018-06-26 DIAGNOSIS — I49.5 SICK SINUS SYNDROME (HCC): ICD-10-CM

## 2018-06-26 DIAGNOSIS — C61 PROSTATE CANCER (HCC): ICD-10-CM

## 2018-06-26 DIAGNOSIS — E11.9 TYPE 2 DIABETES MELLITUS WITHOUT COMPLICATION, WITHOUT LONG-TERM CURRENT USE OF INSULIN (HCC): ICD-10-CM

## 2018-06-26 DIAGNOSIS — M1A.09X0 IDIOPATHIC CHRONIC GOUT OF MULTIPLE SITES WITHOUT TOPHUS: ICD-10-CM

## 2018-06-26 DIAGNOSIS — Z13.29 SCREENING FOR THYROID DISORDER: ICD-10-CM

## 2018-06-26 DIAGNOSIS — F03.90 DEMENTIA WITHOUT BEHAVIORAL DISTURBANCE, UNSPECIFIED DEMENTIA TYPE: ICD-10-CM

## 2018-06-26 PROBLEM — R29.6 FREQUENT FALLS: Status: RESOLVED | Noted: 2017-07-18 | Resolved: 2018-06-26

## 2018-06-26 PROBLEM — J40 BRONCHITIS: Status: RESOLVED | Noted: 2018-03-06 | Resolved: 2018-06-26

## 2018-06-26 PROCEDURE — 99214 OFFICE O/P EST MOD 30 MIN: CPT | Performed by: FAMILY MEDICINE

## 2018-06-26 RX ORDER — TRAZODONE HYDROCHLORIDE 100 MG/1
100 TABLET ORAL
Qty: 30 TAB | Refills: 3 | Status: SHIPPED | OUTPATIENT
Start: 2018-06-26

## 2018-06-26 ASSESSMENT — PATIENT HEALTH QUESTIONNAIRE - PHQ9: CLINICAL INTERPRETATION OF PHQ2 SCORE: 0

## 2018-06-27 PROBLEM — R26.89 IMBALANCE: Status: ACTIVE | Noted: 2018-06-27

## 2018-06-27 NOTE — PROGRESS NOTES
Subjective:     Chief Complaint   Patient presents with   • Medication Refill     Sleep Medication   • Orders Needed     Labs       Moises Ordoñez is a 86 y.o. male here today for evaluation and management of:    Dementia without behavioral disturbance  Patient is here today with his son and daughter-in-law. They're concerned about his worsening dementia as well as the care that his wife can currently provide for him. He is living with their family in an apartment with his wife. He does wake in the middle of the night and has wandered the streets on a few occasions. At one time he was brought back by the police. They now have alarms on all the doors. They are wondering if there is something that can help him sleep better. He is not on any medications for his dementia although they have been following up with neurology. The medications did not seem to help so they stopped them.    Essential hypertension  Stable. Currently taking atenolol 50 mg as directed.   He is taking baby aspirin daily.   He is not monitoring BP at home.   Denies symptoms low BP: light-headed, tunnel-vision, unusual fatigue.   Denies symptoms high BP:pounding headache, visual changes, palpitations, flushed face.   Denies medicine side effects: unusual fatigue, slow heartbeat, foot/leg swelling, cough.      Idiopathic chronic gout of multiple sites without tophus  Stable. Currently taking allopurinol 100 mg mg daily as prescribed.  Denies recent flare-ups. Denies side effects from medication--tolerating well.   Patient's daughter-in-law would like to stop this medication possible. He was not taking it for 2 months while his wife was in rehabilitation after her last hospitalization.    Meningioma  The patient has a meningioma of the frontal lobe. This has been stable. He is having some balance issues and the family would like to consider physical therapy    Mixed hyperlipidemia  Dyslipidemia Chronic condition. Current treatments:  diet/exercise/medicines. He is taking medicine as directed. Current side effects: none.       Sick sinus syndrome  Patient follows with cardiology for this.    Type 2 diabetes mellitus (CMS-[facility])  Stable. Currently taking glipizide 5 mg twice a day. He was off the medications for 2 months and appeared to do well on this. His daughter-in-law would like him to not be on the medication.  Denies side effects or hypoglycemic events.  He is not monitoring blood sugar regularly at home.  Reports diet is poor  He is not exercising regularly.  Last eye exam: Unsure  Denies polyuria, polydipsia, blurred vision, or neuropathies.         Allergies   Allergen Reactions   • Metformin      seizures   • Penicillins Unspecified     Pt unsure of reaction.       Current medicines (including changes today)  Current Outpatient Prescriptions   Medication Sig Dispense Refill   • traZODone (DESYREL) 100 MG Tab Take 1 Tab by mouth every bedtime. 30 Tab 3   • rosuvastatin (CRESTOR) 5 MG Tab Take 1 Tab by mouth every day. 30 Tab 6   • atenolol (TENORMIN) 50 MG Tab TAKE 1 TAB BY MOUTH EVERY DAY. 90 Tab 1   • glipiZIDE (GLUCOTROL) 5 MG Tab Take 1 Tab by mouth 2 times a day. 60 Tab 3   • allopurinol (ZYLOPRIM) 100 MG Tab TAKE 1 TABLET BY MOUTH EVERY DAY 90 Tab 3   • aspirin 81 MG EC tablet Take 81 mg by mouth.       No current facility-administered medications for this visit.        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    Patient Active Problem List    Diagnosis Date Noted   • Sick sinus syndrome (HCC) 10/07/2013     Priority: High   • AV block 10/07/2013     Priority: High   • Mixed hyperlipidemia 01/16/2012     Priority: High   • Essential hypertension 01/16/2012     Priority: High   • Presence of  permanent cardiac pacemaker 01/16/2012     Priority: High   • Dementia without behavioral disturbance 07/25/2016     Priority: Medium   • Type 2 diabetes mellitus (HCC) 07/25/2016     Priority: Medium   • Paroxysmal atrial fibrillation (HCC) 01/16/2012     Priority: Medium   • Prostate cancer (HCC) 10/07/2013     Priority: Low   • Idiopathic chronic gout of multiple sites without tophus 01/16/2012     Priority: Low   • Imbalance 06/27/2018   • Systolic dysfunction 03/28/2018   • Meningioma (HCC) 07/07/2015   • Seizure (HCC) 07/06/2015       ROS   No fever or chills.  No nausea or vomiting.  No chest pain or palpitations.  No cough or SOB.  No pain with urination or hematuria.  No black or bloody stools.       Objective:     Blood pressure 136/84, pulse 68, temperature 36.4 °C (97.6 °F), resp. rate 20, height 1.829 m (6'), weight 90.3 kg (199 lb), SpO2 95 %. Body mass index is 26.99 kg/m².   Physical Exam:  Well developed, well nourished.  Alert,  in no acute distress.  Eye contact is good, speech goal directed, affect calm  Eyes: conjunctiva non-injected, sclera non-icteric.  Neck Supple.  No adenopathy or masses in the neck or supraclavicular regions. No thyromegaly  Lungs: clear to auscultation bilaterally with good excursion. No wheezes or rhonchi  CV: regular rate and rhythm. No murmur            Assessment and Plan:   The following treatment plan was discussed    1. Dementia without behavioral disturbance, unspecified dementia type  Discussed behavioral modifications and getting respite care as needed. Consider follow-up with neurology  - REFERRAL TO PHYSICAL THERAPY Reason for Therapy: Eval/Treat/Report    2. Essential hypertension  This is a chronic medical condition that is currently stable  Continue current medications  - COMP METABOLIC PANEL; Future    3. Idiopathic chronic gout of multiple sites without tophus  This is a chronic medical condition that is currently stable  I would recommend staying on the  allopurinol so that he does not have a flare.  - COMP METABOLIC PANEL; Future    4. Meningioma (HCC)  As he is having balance problems we will refer him to physical therapy for gait training and strengthening  - REFERRAL TO PHYSICAL THERAPY Reason for Therapy: Eval/Treat/Report    5. Mixed hyperlipidemia  This is a chronic medical condition that is currently stable  Continue current medications  - COMP METABOLIC PANEL; Future  - LIPID PROFILE; Future    6. Prostate cancer ([facility])  Patient is not seeking treatment for this at this time. Follow up with urology as scheduled  - REFERRAL TO PHYSICAL THERAPY Reason for Therapy: Eval/Treat/Report    7. Presence of permanent cardiac pacemaker  Follow-up with cardiology as scheduled    8. Sick sinus syndrome ([facility])  Follow-up with cardiology as scheduled    9. Type 2 diabetes mellitus without complication, without long-term current use of insulin ([facility])  Check labs. Consider stopping glipizide  - COMP METABOLIC PANEL; Future  - HEMOGLOBIN A1C; Future    10. Screening for thyroid disorder  Screening labs ordered.  Await results for counseling.    - TSH; Future  - FREE THYROXINE; Future    11. Screening for deficiency anemia  Screening labs ordered.  Await results for counseling.    - CBC WITH DIFFERENTIAL; Future    12. Insomnia due to medical condition  Trial of increasing trazodone dose. Recheck in 6 weeks to determine effectiveness  - traZODone (DESYREL) 100 MG Tab; Take 1 Tab by mouth every bedtime.  Dispense: 30 Tab; Refill: 3    13. Imbalance  Refer to physical therapy  - REFERRAL TO PHYSICAL THERAPY Reason for Therapy: Eval/Treat/Report    Any change or worsening of signs or symptoms, patient encouraged to follow-up or report to the emergency room for further evaluation. Patient understands and agrees.    Followup: Return in about 6 months (around 12/26/2018).

## 2018-06-27 NOTE — ASSESSMENT & PLAN NOTE
Stable. Currently taking atenolol 50 mg as directed.   He is taking baby aspirin daily.   He is not monitoring BP at home.   Denies symptoms low BP: light-headed, tunnel-vision, unusual fatigue.   Denies symptoms high BP:pounding headache, visual changes, palpitations, flushed face.   Denies medicine side effects: unusual fatigue, slow heartbeat, foot/leg swelling, cough.

## 2018-06-27 NOTE — ASSESSMENT & PLAN NOTE
The patient has a meningioma of the frontal lobe. This has been stable. He is having some balance issues and the family would like to consider physical therapy

## 2018-06-27 NOTE — ASSESSMENT & PLAN NOTE
Patient is here today with his son and daughter-in-law. They're concerned about his worsening dementia as well as the care that his wife can currently provide for him. He is living with their family in an apartment with his wife. He does wake in the middle of the night and has wandered the streets on a few occasions. At one time he was brought back by the police. They now have alarms on all the doors. They are wondering if there is something that can help him sleep better. He is not on any medications for his dementia although they have been following up with neurology. The medications did not seem to help so they stopped them.

## 2018-06-27 NOTE — ASSESSMENT & PLAN NOTE
Dyslipidemia Chronic condition. Current treatments: diet/exercise/medicines. He is taking medicine as directed. Current side effects: none.

## 2018-06-27 NOTE — ASSESSMENT & PLAN NOTE
Stable. Currently taking glipizide 5 mg twice a day. He was off the medications for 2 months and appeared to do well on this. His daughter-in-law would like him to not be on the medication.  Denies side effects or hypoglycemic events.  He is not monitoring blood sugar regularly at home.  Reports diet is poor  He is not exercising regularly.  Last eye exam: Unsure  Denies polyuria, polydipsia, blurred vision, or neuropathies.

## 2018-06-27 NOTE — ASSESSMENT & PLAN NOTE
Stable. Currently taking allopurinol 100 mg mg daily as prescribed.  Denies recent flare-ups. Denies side effects from medication--tolerating well.   Patient's daughter-in-law would like to stop this medication possible. He was not taking it for 2 months while his wife was in rehabilitation after her last hospitalization.

## 2018-07-10 ENCOUNTER — TELEPHONE (OUTPATIENT)
Dept: MEDICAL GROUP | Facility: MEDICAL CENTER | Age: 83
End: 2018-07-10

## 2018-07-10 NOTE — TELEPHONE ENCOUNTER
----- Message from Letha Guillory sent at 7/10/2018 10:21 AM PDT -----  Regarding: ORDERS  Patients wife called and states Mr. Ordoñez needs home health orders for PT sent to [facility]

## 2018-07-12 ENCOUNTER — HOME HEALTH ADMISSION (OUTPATIENT)
Dept: HOME HEALTH SERVICES | Facility: HOME HEALTHCARE | Age: 83
End: 2018-07-12
Payer: MEDICARE

## 2018-07-12 DIAGNOSIS — D32.9 MENINGIOMA (HCC): ICD-10-CM

## 2018-07-12 DIAGNOSIS — C61 PROSTATE CANCER (HCC): ICD-10-CM

## 2018-07-12 DIAGNOSIS — R53.1 WEAKNESS: ICD-10-CM

## 2018-07-12 DIAGNOSIS — F03.90 DEMENTIA WITHOUT BEHAVIORAL DISTURBANCE, UNSPECIFIED DEMENTIA TYPE: ICD-10-CM

## 2018-07-12 DIAGNOSIS — R26.89 IMBALANCE: ICD-10-CM

## 2018-07-12 DIAGNOSIS — E11.9 TYPE 2 DIABETES MELLITUS WITHOUT COMPLICATION, WITHOUT LONG-TERM CURRENT USE OF INSULIN (HCC): ICD-10-CM

## 2018-07-23 ENCOUNTER — TELEPHONE (OUTPATIENT)
Dept: MEDICAL GROUP | Facility: MEDICAL CENTER | Age: 83
End: 2018-07-23

## 2018-07-23 DIAGNOSIS — R30.0 DYSURIA: ICD-10-CM

## 2018-07-23 NOTE — TELEPHONE ENCOUNTER
1. Caller Name:  Pt's wife                       Call Back Number:  [phone] (home)     2. Message: [facility] is requesting a urine test for pt. Please order fax to: [phone]    3. Patient approves office to leave a detailed voicemail/MyChart message: yes